# Patient Record
Sex: FEMALE | Race: WHITE | NOT HISPANIC OR LATINO | Employment: FULL TIME | URBAN - METROPOLITAN AREA
[De-identification: names, ages, dates, MRNs, and addresses within clinical notes are randomized per-mention and may not be internally consistent; named-entity substitution may affect disease eponyms.]

---

## 2023-03-06 LAB
ANION GAP IN SER/PLAS: 12 MMOL/L (ref 10–20)
APPEARANCE, URINE: NORMAL
BASOPHILS (10*3/UL) IN BLOOD BY AUTOMATED COUNT: 0.04 X10E9/L (ref 0–0.1)
BASOPHILS/100 LEUKOCYTES IN BLOOD BY AUTOMATED COUNT: 0.5 % (ref 0–2)
BILIRUBIN, URINE: NEGATIVE
BLOOD, URINE: NEGATIVE
CALCIUM (MG/DL) IN SER/PLAS: 9 MG/DL (ref 8.6–10.3)
CARBON DIOXIDE, TOTAL (MMOL/L) IN SER/PLAS: 25 MMOL/L (ref 21–32)
CHLORIDE (MMOL/L) IN SER/PLAS: 104 MMOL/L (ref 98–107)
COLOR, URINE: YELLOW
CREATININE (MG/DL) IN SER/PLAS: 0.65 MG/DL (ref 0.5–1.05)
EOSINOPHILS (10*3/UL) IN BLOOD BY AUTOMATED COUNT: 0.24 X10E9/L (ref 0–0.7)
EOSINOPHILS/100 LEUKOCYTES IN BLOOD BY AUTOMATED COUNT: 3 % (ref 0–6)
ERYTHROCYTE DISTRIBUTION WIDTH (RATIO) BY AUTOMATED COUNT: 12.3 % (ref 11.5–14.5)
ERYTHROCYTE MEAN CORPUSCULAR HEMOGLOBIN CONCENTRATION (G/DL) BY AUTOMATED: 34 G/DL (ref 32–36)
ERYTHROCYTE MEAN CORPUSCULAR VOLUME (FL) BY AUTOMATED COUNT: 89 FL (ref 80–100)
ERYTHROCYTES (10*6/UL) IN BLOOD BY AUTOMATED COUNT: 4.24 X10E12/L (ref 4–5.2)
GFR FEMALE: >90 ML/MIN/1.73M2
GLUCOSE (MG/DL) IN SER/PLAS: 81 MG/DL (ref 74–99)
GLUCOSE, URINE: NEGATIVE MG/DL
HEMATOCRIT (%) IN BLOOD BY AUTOMATED COUNT: 37.7 % (ref 36–46)
HEMOGLOBIN (G/DL) IN BLOOD: 12.8 G/DL (ref 12–16)
IMMATURE GRANULOCYTES/100 LEUKOCYTES IN BLOOD BY AUTOMATED COUNT: 0.2 % (ref 0–0.9)
KETONES, URINE: NEGATIVE MG/DL
LEUKOCYTE ESTERASE, URINE: NEGATIVE
LEUKOCYTES (10*3/UL) IN BLOOD BY AUTOMATED COUNT: 8 X10E9/L (ref 4.4–11.3)
LYMPHOCYTES (10*3/UL) IN BLOOD BY AUTOMATED COUNT: 2.59 X10E9/L (ref 1.2–4.8)
LYMPHOCYTES/100 LEUKOCYTES IN BLOOD BY AUTOMATED COUNT: 32.3 % (ref 13–44)
MONOCYTES (10*3/UL) IN BLOOD BY AUTOMATED COUNT: 0.58 X10E9/L (ref 0.1–1)
MONOCYTES/100 LEUKOCYTES IN BLOOD BY AUTOMATED COUNT: 7.2 % (ref 2–10)
NEUTROPHILS (10*3/UL) IN BLOOD BY AUTOMATED COUNT: 4.55 X10E9/L (ref 1.2–7.7)
NEUTROPHILS/100 LEUKOCYTES IN BLOOD BY AUTOMATED COUNT: 56.8 % (ref 40–80)
NITRITE, URINE: NEGATIVE
PH, URINE: 5 (ref 5–8)
PLATELETS (10*3/UL) IN BLOOD AUTOMATED COUNT: 291 X10E9/L (ref 150–450)
POTASSIUM (MMOL/L) IN SER/PLAS: 4 MMOL/L (ref 3.5–5.3)
PROTEIN, URINE: NEGATIVE MG/DL
SODIUM (MMOL/L) IN SER/PLAS: 137 MMOL/L (ref 136–145)
SPECIFIC GRAVITY, URINE: 1.03 (ref 1–1.03)
UREA NITROGEN (MG/DL) IN SER/PLAS: 19 MG/DL (ref 6–23)
UROBILINOGEN, URINE: <2 MG/DL (ref 0–1.9)

## 2023-03-08 LAB — STAPH/MRSA SCREEN, CULTURE: ABNORMAL

## 2023-03-14 ENCOUNTER — APPOINTMENT (OUTPATIENT)
Dept: LAB | Facility: LAB | Age: 38
End: 2023-03-14

## 2023-03-14 LAB — SARS-COV-2 RESULT: NOT DETECTED

## 2023-04-10 LAB
GRAM STAIN: ABNORMAL
TISSUE/WOUND CULTURE/SMEAR: ABNORMAL

## 2023-06-16 ENCOUNTER — HOSPITAL ENCOUNTER (OUTPATIENT)
Dept: DATA CONVERSION | Facility: HOSPITAL | Age: 38
End: 2023-06-16
Attending: OBSTETRICS & GYNECOLOGY | Admitting: OBSTETRICS & GYNECOLOGY
Payer: COMMERCIAL

## 2023-06-16 DIAGNOSIS — N90.89 OTHER SPECIFIED NONINFLAMMATORY DISORDERS OF VULVA AND PERINEUM: ICD-10-CM

## 2023-06-16 DIAGNOSIS — R39.198 OTHER DIFFICULTIES WITH MICTURITION: ICD-10-CM

## 2023-06-16 DIAGNOSIS — N99.89 OTHER POSTPROCEDURAL COMPLICATIONS AND DISORDERS OF GENITOURINARY SYSTEM: ICD-10-CM

## 2023-06-16 DIAGNOSIS — K64.4 RESIDUAL HEMORRHOIDAL SKIN TAGS: ICD-10-CM

## 2023-06-16 DIAGNOSIS — N39.8 OTHER SPECIFIED DISORDERS OF URINARY SYSTEM: ICD-10-CM

## 2023-06-16 DIAGNOSIS — F41.9 ANXIETY DISORDER, UNSPECIFIED: ICD-10-CM

## 2023-06-22 LAB
COMPLETE PATHOLOGY REPORT: NORMAL
CONVERTED CLINICAL DIAGNOSIS-HISTORY: NORMAL
CONVERTED FINAL DIAGNOSIS: NORMAL
CONVERTED FINAL REPORT PDF LINK TO COPY AND PASTE: NORMAL
CONVERTED GROSS DESCRIPTION: NORMAL

## 2023-07-07 LAB — URINE CULTURE: NO GROWTH

## 2023-09-07 VITALS — WEIGHT: 185.19 LBS | HEIGHT: 68 IN | BODY MASS INDEX: 28.07 KG/M2

## 2023-09-30 NOTE — H&P
History of Present Illness:   Pregnant/Lactating:  ·  Are You Pregnant no   ·  Are You Currently Breastfeeding no     History Present Illness:  Reason for surgery: Overactive bladder, perineal  skin tag   HPI:    37 year old female patient who presents to the clinic today for  a TVT sling release, perineorrhaphy and cystoscopy    Prolapse Symptoms:   - Pt denies any prolapse sx's     Urinary Symptoms:  - 3/17/2023 surgery: Robotic-assisted laparoscopic supracervical hysterectomy, bilateral salpingectomy, SCP, midurethral sling, perineorrhaphy, and cystoscopy with Dr. Velazquez.  - Pt reports she is having a hard time emptying her bladder especially in the morning    Medical History  Anxiety   Depression   Migraines   Vitamin D deficiency     STOP BANG  0                                                                                                                                                         CAPRINI  3  Surgical History  Hysterectomy as above  D&C  Laparoscopy 2010   Ovarian cystectomy     Pt denies any past history of anesthetic complications such as PONV, awareness, prolonged sedation, dental damage, aspiration, cardiac arrest, difficult intubation, difficult I.V. access or unexpected hospital admissions.  No malignant hyperthermia or pseudocholinesterase deficiency.   No history blood transfusions  Pt is not a Jehov[employer] Witness and will accept blood and blood products if medically indicated.                                                Type and screen not sent.        Allergies:        Allergies:  ·  No Known Allergies :     Home Medication Review:   Home Medications Reviewed: yes     Impression/Procedure:   ·  Impression and Planned Procedure: Midurethral sling release, skin tag removal and perineorrhaphy, cystoscopy       ERAS (Enhanced Recovery After Surgery):  ·  ERAS Patient: no     Review of Systems:   Review of Systems:  All Other Systems: All other systems reviewed and  are negative        Physical Exam by System:    Constitutional: Alert, no acute distress   Eyes: Sclerae clear   ENMT: MMM, no apparent lesions or injury   Head/Neck: NCAT   Respiratory/Thorax: Normal respiratory effort on  RA   Cardiovascular: Regular rate   Gastrointestinal: Defer to OR   Genitourinary: Defer to OR   Musculoskeletal: ROM grossly normal   Extremities: No edema, erythema, or cyanosis   Neurological: no focal deficits   Breast: Deferred   Lymphatic: Deferred   Psychological: Appropriate affect and behavior   Skin: No obvious rashes or lesions     Consent:   COVID-19 Consent:  ·  COVID-19 Risk Consent Surgeon has reviewed key risks related to the risk of yamileth COVID-19 and if they contract COVID-19 what the risks are.     Attestation:   Note Completion:  I am a:  Resident/Fellow   Attending Attestation I saw and evaluated the patient.  I personally obtained the key and critical portions of the history and physical exam or was physically present for key and  critical portions performed by the resident/fellow. I reviewed the resident/fellow?s documentation and discussed the patient with the resident/fellow.  I agree with the resident/fellow?s medical decision making as documented in the note.     I personally evaluated the patient on 16-Jun-2023         Electronic Signatures:  Betty Velazquez)  (Signed 16-Jun-2023 07:46)   Authored: Note Completion   Co-Signer: History of Present Illness, Allergies, Home Medication Review, Impression/Procedure, ERAS, Review of Systems, Physical Exam,  Consent, Note Completion  Abad Borden (Resident))  (Signed 15-Aj-2023 18:49)   Authored: History of Present Illness, Allergies, Home  Medication Review, Impression/Procedure, ERAS, Review of Systems, Physical Exam, Consent, Note Completion      Last Updated: 16-Jun-2023 07:46 by Betty Velazquez)

## 2023-10-02 NOTE — OP NOTE
Post Operative Note:     PreOp Diagnosis: voiding dysfunction after sling,  perineal skin tag   Post-Procedure Diagnosis: same   Procedure: 1. Sling lysis  2. excision perineal skin tag  3.   4.   5.   Surgeon: SERAFIN Velazquez MD   Resident/Fellow/Other Assistant: none   Anesthesia: GETA   I.V. Fluids: 400   Estimated Blood Loss (mL): 20   Specimen: yes. 1) perineal skin tag   Complications: none   Findings: Sling transected without issue, skin tag  excised   Patient Returned To/Condition: PACU stable   Urine Output: 600     Operative Report Dictated:  Dictation: not applicable - note contains Operative  Report   Operative Report:    Pt consented and taken to Or where GETA anesthesia obtained. 2 g ancef given IV   Pt placed in dorsal lithotomy position and prepped and draped in standard fashion.  Monahan catheter placed. [place] retractor placed with 4 stays to expose ant vaginal wall.  Mid urethral grasped with 2 allis clamps and injected with 1% lidocaine with epi and incision made  Sharp dissection performed in form of a sling dissection.  With much difficulty the clear sling mesh was isolated to the right of urethra, undermined bluntly and transected with a knife. Significant separation was noted suggesting that it was tight,  A small jagged portion of remaining mesh was excised.  Purse sling closure with 3-0 monocryl was performed of the right sling tunnel. Closure of the vaginal mucosa performed in a running locked fashion with 3-0 monocryl with 2 interrupted sutures.    The perineal skin tag was isolated and injected with 1% lido with epi and incised.  It was then made hemostatic with bovie cautery and closed with two interrupted 3-0 monocryl sutures    The patient was cleaned and awoken from GETA and taken to the PACU in stable condition.  All sponge lap and needle counts correct x 2 and Dr. Velazquez was present and scrubbed for the entire procedure.    Attestation:   Note Completion:  Attending Attestation I  performed the procedure without a resident         Electronic Signatures:  Betty Velazquez)  (Signed 16-Jun-2023 13:25)   Authored: Post Operative Note, Note Completion      Last Updated: 16-Jun-2023 13:25 by Betty Velazquez)

## 2023-10-10 ENCOUNTER — PHARMACY VISIT (OUTPATIENT)
Dept: PHARMACY | Facility: CLINIC | Age: 38
End: 2023-10-10
Payer: COMMERCIAL

## 2023-10-10 PROCEDURE — RXMED WILLOW AMBULATORY MEDICATION CHARGE

## 2023-11-02 ENCOUNTER — PHARMACY VISIT (OUTPATIENT)
Dept: PHARMACY | Facility: CLINIC | Age: 38
End: 2023-11-02
Payer: COMMERCIAL

## 2023-11-02 ENCOUNTER — TELEMEDICINE (OUTPATIENT)
Dept: PRIMARY CARE | Facility: CLINIC | Age: 38
End: 2023-11-02
Payer: COMMERCIAL

## 2023-11-02 DIAGNOSIS — H10.33 ACUTE BACTERIAL CONJUNCTIVITIS OF BOTH EYES: Primary | ICD-10-CM

## 2023-11-02 PROCEDURE — 99213 OFFICE O/P EST LOW 20 MIN: CPT | Performed by: NURSE PRACTITIONER

## 2023-11-02 PROCEDURE — RXMED WILLOW AMBULATORY MEDICATION CHARGE

## 2023-11-02 RX ORDER — TOBRAMYCIN 3 MG/ML
2 SOLUTION/ DROPS OPHTHALMIC 4 TIMES DAILY
Qty: 5 ML | Refills: 0 | Status: SHIPPED | OUTPATIENT
Start: 2023-11-02 | End: 2023-11-15

## 2023-11-02 ASSESSMENT — ENCOUNTER SYMPTOMS
EYE ITCHING: 1
EYE DISCHARGE: 1
FACIAL SWELLING: 0
EYE PAIN: 1
CHILLS: 0
PHOTOPHOBIA: 0
FEVER: 0

## 2023-11-02 NOTE — PROGRESS NOTES
Subjective   Patient ID: Ninoska Crain is a 38 y.o. female who presents for Conjunctivitis.  Both eyes itchy and red with yellow discharge today.  Household exposure likely.  No ear pain, some recent URI sx.    Conjunctivitis   Associated symptoms include eye itching, congestion, eye discharge and eye pain. Pertinent negatives include no fever, no photophobia and no ear pain.       Review of Systems   Constitutional:  Negative for chills and fever.   HENT:  Positive for congestion. Negative for ear pain and facial swelling.    Eyes:  Positive for pain, discharge and itching. Negative for photophobia and visual disturbance.       Objective   Physical Exam  Constitutional:       Appearance: Normal appearance.   Eyes:      General:         Right eye: Discharge present.         Left eye: Discharge present.     Comments: B/l conjunctivae injected.  EOMS intact, no photophobia or pain with EOMs.   Pulmonary:      Effort: Pulmonary effort is normal.   Musculoskeletal:      Cervical back: Normal range of motion.   Neurological:      Mental Status: She is alert.   Psychiatric:         Mood and Affect: Mood normal.         Assessment/Plan   Diagnoses and all orders for this visit:  Acute bacterial conjunctivitis of both eyes  -     tobramycin (Tobrex) 0.3 % ophthalmic solution; Administer 2 drops into both eyes 4 times a day for 7 days.

## 2023-11-02 NOTE — ASSESSMENT & PLAN NOTE
Hx and limited exam are c/w bacterial conjunctivitis.  Reviewed infectivity and prophylaxis, use of drops.   Discussed typical course  versus symptoms that require immediate attention - tenseness around eye, pain with movement of eyes or to light.

## 2023-11-04 ENCOUNTER — PHARMACY VISIT (OUTPATIENT)
Dept: PHARMACY | Facility: CLINIC | Age: 38
End: 2023-11-04
Payer: COMMERCIAL

## 2023-11-04 PROCEDURE — RXMED WILLOW AMBULATORY MEDICATION CHARGE

## 2023-11-24 ENCOUNTER — HOSPITAL ENCOUNTER (INPATIENT)
Facility: HOSPITAL | Age: 38
LOS: 2 days | Discharge: HOME | DRG: 330 | End: 2023-11-26
Attending: STUDENT IN AN ORGANIZED HEALTH CARE EDUCATION/TRAINING PROGRAM | Admitting: STUDENT IN AN ORGANIZED HEALTH CARE EDUCATION/TRAINING PROGRAM
Payer: COMMERCIAL

## 2023-11-24 ENCOUNTER — APPOINTMENT (OUTPATIENT)
Dept: RADIOLOGY | Facility: HOSPITAL | Age: 38
DRG: 330 | End: 2023-11-24
Payer: COMMERCIAL

## 2023-11-24 ENCOUNTER — ANESTHESIA EVENT (OUTPATIENT)
Dept: OPERATING ROOM | Facility: HOSPITAL | Age: 38
DRG: 330 | End: 2023-11-24
Payer: COMMERCIAL

## 2023-11-24 ENCOUNTER — ANESTHESIA (OUTPATIENT)
Dept: OPERATING ROOM | Facility: HOSPITAL | Age: 38
DRG: 330 | End: 2023-11-24
Payer: COMMERCIAL

## 2023-11-24 DIAGNOSIS — K45.8 INTERNAL HERNIA: ICD-10-CM

## 2023-11-24 DIAGNOSIS — K56.609 SBO (SMALL BOWEL OBSTRUCTION) (MULTI): Primary | ICD-10-CM

## 2023-11-24 PROBLEM — N32.81 OVERACTIVE BLADDER: Chronic | Status: ACTIVE | Noted: 2023-11-24

## 2023-11-24 PROBLEM — F32.9 MAJOR DEPRESSIVE DISORDER: Chronic | Status: ACTIVE | Noted: 2023-11-24

## 2023-11-24 LAB
ABO GROUP (TYPE) IN BLOOD: NORMAL
ABO GROUP (TYPE) IN BLOOD: NORMAL
ALBUMIN SERPL BCP-MCNC: 4.5 G/DL (ref 3.4–5)
ALP SERPL-CCNC: 59 U/L (ref 33–110)
ALT SERPL W P-5'-P-CCNC: 24 U/L (ref 7–45)
ANION GAP SERPL CALC-SCNC: 16 MMOL/L (ref 10–20)
ANTIBODY SCREEN: NORMAL
APPEARANCE UR: CLEAR
AST SERPL W P-5'-P-CCNC: 22 U/L (ref 9–39)
B-HCG SERPL-ACNC: <2 MIU/ML
BASOPHILS # BLD AUTO: 0.04 X10*3/UL (ref 0–0.1)
BASOPHILS NFR BLD AUTO: 0.3 %
BILIRUB SERPL-MCNC: 0.4 MG/DL (ref 0–1.2)
BILIRUB UR STRIP.AUTO-MCNC: NEGATIVE MG/DL
BUN SERPL-MCNC: 11 MG/DL (ref 6–23)
CALCIUM SERPL-MCNC: 9.7 MG/DL (ref 8.6–10.3)
CHLORIDE SERPL-SCNC: 103 MMOL/L (ref 98–107)
CO2 SERPL-SCNC: 20 MMOL/L (ref 21–32)
COLOR UR: YELLOW
CREAT SERPL-MCNC: 0.65 MG/DL (ref 0.5–1.05)
EOSINOPHIL # BLD AUTO: 0.1 X10*3/UL (ref 0–0.7)
EOSINOPHIL NFR BLD AUTO: 0.7 %
ERYTHROCYTE [DISTWIDTH] IN BLOOD BY AUTOMATED COUNT: 12.2 % (ref 11.5–14.5)
GFR SERPL CREATININE-BSD FRML MDRD: >90 ML/MIN/1.73M*2
GLUCOSE SERPL-MCNC: 108 MG/DL (ref 74–99)
GLUCOSE UR STRIP.AUTO-MCNC: NEGATIVE MG/DL
HCG UR QL IA.RAPID: NEGATIVE
HCT VFR BLD AUTO: 42.7 % (ref 36–46)
HGB BLD-MCNC: 14 G/DL (ref 12–16)
HOLD SPECIMEN: NORMAL
IMM GRANULOCYTES # BLD AUTO: 0.06 X10*3/UL (ref 0–0.7)
IMM GRANULOCYTES NFR BLD AUTO: 0.4 % (ref 0–0.9)
KETONES UR STRIP.AUTO-MCNC: ABNORMAL MG/DL
LACTATE SERPL-SCNC: 3 MMOL/L (ref 0.4–2)
LACTATE SERPL-SCNC: 5.4 MMOL/L (ref 0.4–2)
LEUKOCYTE ESTERASE UR QL STRIP.AUTO: NEGATIVE
LIPASE SERPL-CCNC: 49 U/L (ref 9–82)
LYMPHOCYTES # BLD AUTO: 2.11 X10*3/UL (ref 1.2–4.8)
LYMPHOCYTES NFR BLD AUTO: 14.6 %
MCH RBC QN AUTO: 30.8 PG (ref 26–34)
MCHC RBC AUTO-ENTMCNC: 32.8 G/DL (ref 32–36)
MCV RBC AUTO: 94 FL (ref 80–100)
MONOCYTES # BLD AUTO: 0.64 X10*3/UL (ref 0.1–1)
MONOCYTES NFR BLD AUTO: 4.4 %
NEUTROPHILS # BLD AUTO: 11.48 X10*3/UL (ref 1.2–7.7)
NEUTROPHILS NFR BLD AUTO: 79.6 %
NITRITE UR QL STRIP.AUTO: NEGATIVE
NRBC BLD-RTO: 0 /100 WBCS (ref 0–0)
PH UR STRIP.AUTO: 8 [PH]
PLATELET # BLD AUTO: 299 X10*3/UL (ref 150–450)
POTASSIUM SERPL-SCNC: 3.8 MMOL/L (ref 3.5–5.3)
PROT SERPL-MCNC: 8.1 G/DL (ref 6.4–8.2)
PROT UR STRIP.AUTO-MCNC: NEGATIVE MG/DL
RBC # BLD AUTO: 4.54 X10*6/UL (ref 4–5.2)
RBC # UR STRIP.AUTO: NEGATIVE /UL
RH FACTOR (ANTIGEN D): NORMAL
RH FACTOR (ANTIGEN D): NORMAL
SODIUM SERPL-SCNC: 135 MMOL/L (ref 136–145)
SP GR UR STRIP.AUTO: 1.04
UROBILINOGEN UR STRIP.AUTO-MCNC: <2 MG/DL
WBC # BLD AUTO: 14.4 X10*3/UL (ref 4.4–11.3)

## 2023-11-24 PROCEDURE — 2500000005 HC RX 250 GENERAL PHARMACY W/O HCPCS: Performed by: STUDENT IN AN ORGANIZED HEALTH CARE EDUCATION/TRAINING PROGRAM

## 2023-11-24 PROCEDURE — 96365 THER/PROPH/DIAG IV INF INIT: CPT | Mod: 59

## 2023-11-24 PROCEDURE — 3600000004 HC OR TIME - INITIAL BASE CHARGE - PROCEDURE LEVEL FOUR: Performed by: STUDENT IN AN ORGANIZED HEALTH CARE EDUCATION/TRAINING PROGRAM

## 2023-11-24 PROCEDURE — 80053 COMPREHEN METABOLIC PANEL: CPT | Performed by: STUDENT IN AN ORGANIZED HEALTH CARE EDUCATION/TRAINING PROGRAM

## 2023-11-24 PROCEDURE — 2500000004 HC RX 250 GENERAL PHARMACY W/ HCPCS (ALT 636 FOR OP/ED): Performed by: ANESTHESIOLOGIST ASSISTANT

## 2023-11-24 PROCEDURE — 81003 URINALYSIS AUTO W/O SCOPE: CPT | Performed by: STUDENT IN AN ORGANIZED HEALTH CARE EDUCATION/TRAINING PROGRAM

## 2023-11-24 PROCEDURE — 49659 UNLSTD LAPS PX HRNAP HRNRPHY: CPT | Performed by: STUDENT IN AN ORGANIZED HEALTH CARE EDUCATION/TRAINING PROGRAM

## 2023-11-24 PROCEDURE — A44202 PR LAP,SURG,ENTERECTOMY,RESECT AND ANAST: Performed by: ANESTHESIOLOGIST ASSISTANT

## 2023-11-24 PROCEDURE — 3600000009 HC OR TIME - EACH INCREMENTAL 1 MINUTE - PROCEDURE LEVEL FOUR: Performed by: STUDENT IN AN ORGANIZED HEALTH CARE EDUCATION/TRAINING PROGRAM

## 2023-11-24 PROCEDURE — 2720000007 HC OR 272 NO HCPCS: Performed by: STUDENT IN AN ORGANIZED HEALTH CARE EDUCATION/TRAINING PROGRAM

## 2023-11-24 PROCEDURE — 83690 ASSAY OF LIPASE: CPT | Performed by: STUDENT IN AN ORGANIZED HEALTH CARE EDUCATION/TRAINING PROGRAM

## 2023-11-24 PROCEDURE — 2500000004 HC RX 250 GENERAL PHARMACY W/ HCPCS (ALT 636 FOR OP/ED): Performed by: STUDENT IN AN ORGANIZED HEALTH CARE EDUCATION/TRAINING PROGRAM

## 2023-11-24 PROCEDURE — 2500000004 HC RX 250 GENERAL PHARMACY W/ HCPCS (ALT 636 FOR OP/ED)

## 2023-11-24 PROCEDURE — 76856 US EXAM PELVIC COMPLETE: CPT

## 2023-11-24 PROCEDURE — 96376 TX/PRO/DX INJ SAME DRUG ADON: CPT

## 2023-11-24 PROCEDURE — 76856 US EXAM PELVIC COMPLETE: CPT | Performed by: RADIOLOGY

## 2023-11-24 PROCEDURE — 74177 CT ABD & PELVIS W/CONTRAST: CPT

## 2023-11-24 PROCEDURE — 36415 COLL VENOUS BLD VENIPUNCTURE: CPT | Performed by: STUDENT IN AN ORGANIZED HEALTH CARE EDUCATION/TRAINING PROGRAM

## 2023-11-24 PROCEDURE — 96361 HYDRATE IV INFUSION ADD-ON: CPT

## 2023-11-24 PROCEDURE — 85025 COMPLETE CBC W/AUTO DIFF WBC: CPT | Performed by: STUDENT IN AN ORGANIZED HEALTH CARE EDUCATION/TRAINING PROGRAM

## 2023-11-24 PROCEDURE — 2060000001 HC INTERMEDIATE ICU ROOM DAILY

## 2023-11-24 PROCEDURE — 83605 ASSAY OF LACTIC ACID: CPT | Performed by: EMERGENCY MEDICINE

## 2023-11-24 PROCEDURE — 2500000005 HC RX 250 GENERAL PHARMACY W/O HCPCS: Performed by: ANESTHESIOLOGIST ASSISTANT

## 2023-11-24 PROCEDURE — 3700000002 HC GENERAL ANESTHESIA TIME - EACH INCREMENTAL 1 MINUTE: Performed by: STUDENT IN AN ORGANIZED HEALTH CARE EDUCATION/TRAINING PROGRAM

## 2023-11-24 PROCEDURE — 2500000004 HC RX 250 GENERAL PHARMACY W/ HCPCS (ALT 636 FOR OP/ED): Performed by: NURSE PRACTITIONER

## 2023-11-24 PROCEDURE — 74177 CT ABD & PELVIS W/CONTRAST: CPT | Performed by: RADIOLOGY

## 2023-11-24 PROCEDURE — 86901 BLOOD TYPING SEROLOGIC RH(D): CPT | Performed by: ANESTHESIOLOGIST ASSISTANT

## 2023-11-24 PROCEDURE — 81025 URINE PREGNANCY TEST: CPT | Performed by: STUDENT IN AN ORGANIZED HEALTH CARE EDUCATION/TRAINING PROGRAM

## 2023-11-24 PROCEDURE — 36415 COLL VENOUS BLD VENIPUNCTURE: CPT | Performed by: EMERGENCY MEDICINE

## 2023-11-24 PROCEDURE — 99223 1ST HOSP IP/OBS HIGH 75: CPT | Performed by: STUDENT IN AN ORGANIZED HEALTH CARE EDUCATION/TRAINING PROGRAM

## 2023-11-24 PROCEDURE — 2550000001 HC RX 255 CONTRASTS

## 2023-11-24 PROCEDURE — 85025 COMPLETE CBC W/AUTO DIFF WBC: CPT | Performed by: EMERGENCY MEDICINE

## 2023-11-24 PROCEDURE — 96372 THER/PROPH/DIAG INJ SC/IM: CPT | Performed by: STUDENT IN AN ORGANIZED HEALTH CARE EDUCATION/TRAINING PROGRAM

## 2023-11-24 PROCEDURE — 96360 HYDRATION IV INFUSION INIT: CPT | Mod: 59

## 2023-11-24 PROCEDURE — 7100000001 HC RECOVERY ROOM TIME - INITIAL BASE CHARGE: Performed by: STUDENT IN AN ORGANIZED HEALTH CARE EDUCATION/TRAINING PROGRAM

## 2023-11-24 PROCEDURE — 83605 ASSAY OF LACTIC ACID: CPT | Performed by: STUDENT IN AN ORGANIZED HEALTH CARE EDUCATION/TRAINING PROGRAM

## 2023-11-24 PROCEDURE — 83690 ASSAY OF LIPASE: CPT | Performed by: EMERGENCY MEDICINE

## 2023-11-24 PROCEDURE — 7100000002 HC RECOVERY ROOM TIME - EACH INCREMENTAL 1 MINUTE: Performed by: STUDENT IN AN ORGANIZED HEALTH CARE EDUCATION/TRAINING PROGRAM

## 2023-11-24 PROCEDURE — 76830 TRANSVAGINAL US NON-OB: CPT | Performed by: RADIOLOGY

## 2023-11-24 PROCEDURE — 3700000001 HC GENERAL ANESTHESIA TIME - INITIAL BASE CHARGE: Performed by: STUDENT IN AN ORGANIZED HEALTH CARE EDUCATION/TRAINING PROGRAM

## 2023-11-24 PROCEDURE — 80053 COMPREHEN METABOLIC PANEL: CPT | Performed by: EMERGENCY MEDICINE

## 2023-11-24 PROCEDURE — 99285 EMERGENCY DEPT VISIT HI MDM: CPT | Performed by: STUDENT IN AN ORGANIZED HEALTH CARE EDUCATION/TRAINING PROGRAM

## 2023-11-24 PROCEDURE — A44202 PR LAP,SURG,ENTERECTOMY,RESECT AND ANAST: Performed by: STUDENT IN AN ORGANIZED HEALTH CARE EDUCATION/TRAINING PROGRAM

## 2023-11-24 PROCEDURE — 96375 TX/PRO/DX INJ NEW DRUG ADDON: CPT

## 2023-11-24 PROCEDURE — 84702 CHORIONIC GONADOTROPIN TEST: CPT

## 2023-11-24 PROCEDURE — 99140 ANES COMP EMERGENCY COND: CPT | Performed by: STUDENT IN AN ORGANIZED HEALTH CARE EDUCATION/TRAINING PROGRAM

## 2023-11-24 PROCEDURE — 99285 EMERGENCY DEPT VISIT HI MDM: CPT | Mod: 25 | Performed by: STUDENT IN AN ORGANIZED HEALTH CARE EDUCATION/TRAINING PROGRAM

## 2023-11-24 RX ORDER — HYDROMORPHONE HYDROCHLORIDE 1 MG/ML
INJECTION, SOLUTION INTRAMUSCULAR; INTRAVENOUS; SUBCUTANEOUS AS NEEDED
Status: DISCONTINUED | OUTPATIENT
Start: 2023-11-24 | End: 2023-11-24

## 2023-11-24 RX ORDER — MORPHINE SULFATE 4 MG/ML
4 INJECTION, SOLUTION INTRAMUSCULAR; INTRAVENOUS ONCE
Status: COMPLETED | OUTPATIENT
Start: 2023-11-24 | End: 2023-11-24

## 2023-11-24 RX ORDER — ONDANSETRON HYDROCHLORIDE 2 MG/ML
4 INJECTION, SOLUTION INTRAVENOUS EVERY 8 HOURS PRN
Status: DISCONTINUED | OUTPATIENT
Start: 2023-11-24 | End: 2023-11-26 | Stop reason: HOSPADM

## 2023-11-24 RX ORDER — SERTRALINE HYDROCHLORIDE 100 MG/1
200 TABLET, FILM COATED ORAL DAILY
Status: DISCONTINUED | OUTPATIENT
Start: 2023-11-24 | End: 2023-11-24

## 2023-11-24 RX ORDER — MEPERIDINE HYDROCHLORIDE 50 MG/ML
12.5 INJECTION INTRAMUSCULAR; INTRAVENOUS; SUBCUTANEOUS EVERY 10 MIN PRN
Status: DISCONTINUED | OUTPATIENT
Start: 2023-11-24 | End: 2023-11-24

## 2023-11-24 RX ORDER — ROCURONIUM BROMIDE 10 MG/ML
INJECTION, SOLUTION INTRAVENOUS AS NEEDED
Status: DISCONTINUED | OUTPATIENT
Start: 2023-11-24 | End: 2023-11-24

## 2023-11-24 RX ORDER — DIPHENHYDRAMINE HYDROCHLORIDE 50 MG/ML
12.5 INJECTION INTRAMUSCULAR; INTRAVENOUS ONCE AS NEEDED
Status: COMPLETED | OUTPATIENT
Start: 2023-11-24 | End: 2023-11-24

## 2023-11-24 RX ORDER — DEXAMETHASONE SODIUM PHOSPHATE 100 MG/10ML
INJECTION INTRAMUSCULAR; INTRAVENOUS AS NEEDED
Status: DISCONTINUED | OUTPATIENT
Start: 2023-11-24 | End: 2023-11-24

## 2023-11-24 RX ORDER — SODIUM CHLORIDE, SODIUM LACTATE, POTASSIUM CHLORIDE, CALCIUM CHLORIDE 600; 310; 30; 20 MG/100ML; MG/100ML; MG/100ML; MG/100ML
50 INJECTION, SOLUTION INTRAVENOUS CONTINUOUS
Status: DISCONTINUED | OUTPATIENT
Start: 2023-11-24 | End: 2023-11-25

## 2023-11-24 RX ORDER — ONDANSETRON 4 MG/1
4 TABLET, ORALLY DISINTEGRATING ORAL EVERY 8 HOURS PRN
Status: DISCONTINUED | OUTPATIENT
Start: 2023-11-24 | End: 2023-11-26 | Stop reason: HOSPADM

## 2023-11-24 RX ORDER — OXYCODONE HYDROCHLORIDE 5 MG/1
5 TABLET ORAL EVERY 6 HOURS PRN
Status: DISCONTINUED | OUTPATIENT
Start: 2023-11-24 | End: 2023-11-26 | Stop reason: HOSPADM

## 2023-11-24 RX ORDER — SERTRALINE HYDROCHLORIDE 100 MG/1
100 TABLET, FILM COATED ORAL 2 TIMES DAILY
Status: DISCONTINUED | OUTPATIENT
Start: 2023-11-24 | End: 2023-11-26 | Stop reason: HOSPADM

## 2023-11-24 RX ORDER — METOCLOPRAMIDE HYDROCHLORIDE 5 MG/ML
10 INJECTION INTRAMUSCULAR; INTRAVENOUS ONCE AS NEEDED
Status: DISCONTINUED | OUTPATIENT
Start: 2023-11-24 | End: 2023-11-24

## 2023-11-24 RX ORDER — LIDOCAINE HYDROCHLORIDE 20 MG/ML
INJECTION, SOLUTION INFILTRATION; PERINEURAL AS NEEDED
Status: DISCONTINUED | OUTPATIENT
Start: 2023-11-24 | End: 2023-11-24

## 2023-11-24 RX ORDER — SODIUM CHLORIDE, SODIUM LACTATE, POTASSIUM CHLORIDE, CALCIUM CHLORIDE 600; 310; 30; 20 MG/100ML; MG/100ML; MG/100ML; MG/100ML
100 INJECTION, SOLUTION INTRAVENOUS CONTINUOUS
Status: DISCONTINUED | OUTPATIENT
Start: 2023-11-24 | End: 2023-11-24

## 2023-11-24 RX ORDER — OXYCODONE HYDROCHLORIDE 5 MG/1
5 TABLET ORAL EVERY 4 HOURS PRN
Status: DISCONTINUED | OUTPATIENT
Start: 2023-11-24 | End: 2023-11-24

## 2023-11-24 RX ORDER — SERTRALINE HYDROCHLORIDE 100 MG/1
100 TABLET, FILM COATED ORAL 2 TIMES DAILY
Status: DISCONTINUED | OUTPATIENT
Start: 2023-11-25 | End: 2023-11-24

## 2023-11-24 RX ORDER — MIDAZOLAM HYDROCHLORIDE 1 MG/ML
INJECTION, SOLUTION INTRAMUSCULAR; INTRAVENOUS AS NEEDED
Status: DISCONTINUED | OUTPATIENT
Start: 2023-11-24 | End: 2023-11-24

## 2023-11-24 RX ORDER — ALBUTEROL SULFATE 0.83 MG/ML
2.5 SOLUTION RESPIRATORY (INHALATION) ONCE AS NEEDED
Status: DISCONTINUED | OUTPATIENT
Start: 2023-11-24 | End: 2023-11-26 | Stop reason: HOSPADM

## 2023-11-24 RX ORDER — ACETAMINOPHEN 325 MG/1
650 TABLET ORAL EVERY 4 HOURS PRN
Status: DISCONTINUED | OUTPATIENT
Start: 2023-11-24 | End: 2023-11-26 | Stop reason: HOSPADM

## 2023-11-24 RX ORDER — LIDOCAINE HYDROCHLORIDE 10 MG/ML
0.1 INJECTION INFILTRATION; PERINEURAL ONCE
Status: DISCONTINUED | OUTPATIENT
Start: 2023-11-24 | End: 2023-11-24

## 2023-11-24 RX ORDER — BUPIVACAINE HCL/EPINEPHRINE 0.5-1:200K
VIAL (ML) INJECTION AS NEEDED
Status: DISCONTINUED | OUTPATIENT
Start: 2023-11-24 | End: 2023-11-24 | Stop reason: HOSPADM

## 2023-11-24 RX ORDER — FENTANYL CITRATE 50 UG/ML
25 INJECTION, SOLUTION INTRAMUSCULAR; INTRAVENOUS EVERY 5 MIN PRN
Status: DISCONTINUED | OUTPATIENT
Start: 2023-11-24 | End: 2023-11-24

## 2023-11-24 RX ORDER — FENTANYL CITRATE 50 UG/ML
INJECTION, SOLUTION INTRAMUSCULAR; INTRAVENOUS AS NEEDED
Status: DISCONTINUED | OUTPATIENT
Start: 2023-11-24 | End: 2023-11-24

## 2023-11-24 RX ORDER — ONDANSETRON HYDROCHLORIDE 2 MG/ML
INJECTION, SOLUTION INTRAVENOUS AS NEEDED
Status: DISCONTINUED | OUTPATIENT
Start: 2023-11-24 | End: 2023-11-24

## 2023-11-24 RX ORDER — HEPARIN SODIUM 5000 [USP'U]/ML
5000 INJECTION, SOLUTION INTRAVENOUS; SUBCUTANEOUS EVERY 8 HOURS SCHEDULED
Status: DISCONTINUED | OUTPATIENT
Start: 2023-11-24 | End: 2023-11-26 | Stop reason: HOSPADM

## 2023-11-24 RX ORDER — ONDANSETRON HYDROCHLORIDE 2 MG/ML
4 INJECTION, SOLUTION INTRAVENOUS ONCE
Status: COMPLETED | OUTPATIENT
Start: 2023-11-24 | End: 2023-11-24

## 2023-11-24 RX ORDER — NALOXONE HYDROCHLORIDE 0.4 MG/ML
0.2 INJECTION, SOLUTION INTRAMUSCULAR; INTRAVENOUS; SUBCUTANEOUS EVERY 5 MIN PRN
Status: DISCONTINUED | OUTPATIENT
Start: 2023-11-24 | End: 2023-11-26 | Stop reason: HOSPADM

## 2023-11-24 RX ORDER — KETOROLAC TROMETHAMINE 15 MG/ML
15 INJECTION, SOLUTION INTRAMUSCULAR; INTRAVENOUS EVERY 8 HOURS SCHEDULED
Status: COMPLETED | OUTPATIENT
Start: 2023-11-24 | End: 2023-11-26

## 2023-11-24 RX ORDER — OXYCODONE HYDROCHLORIDE 10 MG/1
10 TABLET ORAL EVERY 6 HOURS PRN
Status: DISCONTINUED | OUTPATIENT
Start: 2023-11-24 | End: 2023-11-26 | Stop reason: HOSPADM

## 2023-11-24 RX ORDER — PROPOFOL 10 MG/ML
INJECTION, EMULSION INTRAVENOUS AS NEEDED
Status: DISCONTINUED | OUTPATIENT
Start: 2023-11-24 | End: 2023-11-24

## 2023-11-24 RX ORDER — LABETALOL HYDROCHLORIDE 5 MG/ML
5 INJECTION, SOLUTION INTRAVENOUS ONCE AS NEEDED
Status: DISCONTINUED | OUTPATIENT
Start: 2023-11-24 | End: 2023-11-24

## 2023-11-24 RX ADMIN — ONDANSETRON 4 MG: 2 INJECTION INTRAMUSCULAR; INTRAVENOUS at 20:03

## 2023-11-24 RX ADMIN — ROCURONIUM BROMIDE 50 MG: 10 INJECTION, SOLUTION INTRAVENOUS at 18:59

## 2023-11-24 RX ADMIN — ROCURONIUM BROMIDE 10 MG: 10 INJECTION, SOLUTION INTRAVENOUS at 19:55

## 2023-11-24 RX ADMIN — PROPOFOL 200 MG: 10 INJECTION, EMULSION INTRAVENOUS at 18:59

## 2023-11-24 RX ADMIN — MORPHINE SULFATE 4 MG: 4 INJECTION, SOLUTION INTRAMUSCULAR; INTRAVENOUS at 16:28

## 2023-11-24 RX ADMIN — HYDROMORPHONE HYDROCHLORIDE 0.5 MG: 1 INJECTION, SOLUTION INTRAMUSCULAR; INTRAVENOUS; SUBCUTANEOUS at 20:09

## 2023-11-24 RX ADMIN — KETOROLAC TROMETHAMINE 15 MG: 15 INJECTION, SOLUTION INTRAMUSCULAR; INTRAVENOUS at 22:24

## 2023-11-24 RX ADMIN — ONDANSETRON 4 MG: 2 INJECTION INTRAMUSCULAR; INTRAVENOUS at 14:38

## 2023-11-24 RX ADMIN — FENTANYL CITRATE 100 MCG: 50 INJECTION, SOLUTION INTRAMUSCULAR; INTRAVENOUS at 18:59

## 2023-11-24 RX ADMIN — SODIUM CHLORIDE, SODIUM LACTATE, POTASSIUM CHLORIDE, AND CALCIUM CHLORIDE: 600; 310; 30; 20 INJECTION, SOLUTION INTRAVENOUS at 18:53

## 2023-11-24 RX ADMIN — SODIUM CHLORIDE, POTASSIUM CHLORIDE, SODIUM LACTATE AND CALCIUM CHLORIDE 1000 ML: 600; 310; 30; 20 INJECTION, SOLUTION INTRAVENOUS at 14:22

## 2023-11-24 RX ADMIN — HYDROMORPHONE HYDROCHLORIDE 0.5 MG: 1 INJECTION, SOLUTION INTRAMUSCULAR; INTRAVENOUS; SUBCUTANEOUS at 20:46

## 2023-11-24 RX ADMIN — HYDROMORPHONE HYDROCHLORIDE 0.5 MG: 1 INJECTION, SOLUTION INTRAMUSCULAR; INTRAVENOUS; SUBCUTANEOUS at 19:20

## 2023-11-24 RX ADMIN — SERTRALINE HYDROCHLORIDE 100 MG: 100 TABLET ORAL at 23:02

## 2023-11-24 RX ADMIN — HYDROMORPHONE HYDROCHLORIDE 0.5 MG: 1 INJECTION, SOLUTION INTRAMUSCULAR; INTRAVENOUS; SUBCUTANEOUS at 20:59

## 2023-11-24 RX ADMIN — DEXAMETHASONE SODIUM PHOSPHATE 10 MG: 10 INJECTION INTRAMUSCULAR; INTRAVENOUS at 19:13

## 2023-11-24 RX ADMIN — SODIUM CHLORIDE, POTASSIUM CHLORIDE, SODIUM LACTATE AND CALCIUM CHLORIDE 50 ML/HR: 600; 310; 30; 20 INJECTION, SOLUTION INTRAVENOUS at 22:25

## 2023-11-24 RX ADMIN — SUGAMMADEX 200 MG: 100 INJECTION, SOLUTION INTRAVENOUS at 20:03

## 2023-11-24 RX ADMIN — DIPHENHYDRAMINE HYDROCHLORIDE 12.5 MG: 50 INJECTION, SOLUTION INTRAMUSCULAR; INTRAVENOUS at 21:19

## 2023-11-24 RX ADMIN — IOHEXOL 75 ML: 350 INJECTION, SOLUTION INTRAVENOUS at 15:47

## 2023-11-24 RX ADMIN — MORPHINE SULFATE 4 MG: 4 INJECTION, SOLUTION INTRAMUSCULAR; INTRAVENOUS at 14:38

## 2023-11-24 RX ADMIN — LIDOCAINE HYDROCHLORIDE 100 MG: 20 INJECTION, SOLUTION INFILTRATION; PERINEURAL at 18:59

## 2023-11-24 RX ADMIN — HEPARIN SODIUM 5000 UNITS: 5000 INJECTION INTRAVENOUS; SUBCUTANEOUS at 22:24

## 2023-11-24 RX ADMIN — PIPERACILLIN SODIUM AND TAZOBACTAM SODIUM 4.5 G: 4; .5 INJECTION, SOLUTION INTRAVENOUS at 17:22

## 2023-11-24 RX ADMIN — MIDAZOLAM 2 MG: 1 INJECTION INTRAMUSCULAR; INTRAVENOUS at 18:53

## 2023-11-24 RX ADMIN — SODIUM CHLORIDE, SODIUM LACTATE, POTASSIUM CHLORIDE, AND CALCIUM CHLORIDE 1000 ML: 600; 310; 30; 20 INJECTION, SOLUTION INTRAVENOUS at 18:12

## 2023-11-24 SDOH — HEALTH STABILITY: MENTAL HEALTH: CURRENT SMOKER: 0

## 2023-11-24 SDOH — SOCIAL STABILITY: SOCIAL INSECURITY: DO YOU FEEL ANYONE HAS EXPLOITED OR TAKEN ADVANTAGE OF YOU FINANCIALLY OR OF YOUR PERSONAL PROPERTY?: NO

## 2023-11-24 SDOH — SOCIAL STABILITY: SOCIAL INSECURITY: ARE THERE ANY APPARENT SIGNS OF INJURIES/BEHAVIORS THAT COULD BE RELATED TO ABUSE/NEGLECT?: NO

## 2023-11-24 SDOH — SOCIAL STABILITY: SOCIAL INSECURITY: DO YOU FEEL UNSAFE GOING BACK TO THE PLACE WHERE YOU ARE LIVING?: NO

## 2023-11-24 SDOH — SOCIAL STABILITY: SOCIAL INSECURITY: WERE YOU ABLE TO COMPLETE ALL THE BEHAVIORAL HEALTH SCREENINGS?: YES

## 2023-11-24 SDOH — SOCIAL STABILITY: SOCIAL INSECURITY: HAS ANYONE EVER THREATENED TO HURT YOUR FAMILY OR YOUR PETS?: NO

## 2023-11-24 SDOH — SOCIAL STABILITY: SOCIAL INSECURITY: DOES ANYONE TRY TO KEEP YOU FROM HAVING/CONTACTING OTHER FRIENDS OR DOING THINGS OUTSIDE YOUR HOME?: NO

## 2023-11-24 SDOH — SOCIAL STABILITY: SOCIAL INSECURITY: ABUSE: ADULT

## 2023-11-24 SDOH — SOCIAL STABILITY: SOCIAL INSECURITY: HAVE YOU HAD THOUGHTS OF HARMING ANYONE ELSE?: NO

## 2023-11-24 SDOH — SOCIAL STABILITY: SOCIAL INSECURITY: ARE YOU OR HAVE YOU BEEN THREATENED OR ABUSED PHYSICALLY, EMOTIONALLY, OR SEXUALLY BY ANYONE?: NO

## 2023-11-24 ASSESSMENT — LIFESTYLE VARIABLES
EVER FELT BAD OR GUILTY ABOUT YOUR DRINKING: NO
REASON UNABLE TO ASSESS: NO
HOW OFTEN DO YOU HAVE 6 OR MORE DRINKS ON ONE OCCASION: NEVER
AUDIT-C TOTAL SCORE: 0
HOW MANY STANDARD DRINKS CONTAINING ALCOHOL DO YOU HAVE ON A TYPICAL DAY: PATIENT DOES NOT DRINK
HAVE PEOPLE ANNOYED YOU BY CRITICIZING YOUR DRINKING: NO
EVER HAD A DRINK FIRST THING IN THE MORNING TO STEADY YOUR NERVES TO GET RID OF A HANGOVER: NO
SKIP TO QUESTIONS 9-10: 1
HAVE YOU EVER FELT YOU SHOULD CUT DOWN ON YOUR DRINKING: NO
HOW OFTEN DO YOU HAVE A DRINK CONTAINING ALCOHOL: NEVER
AUDIT-C TOTAL SCORE: 0

## 2023-11-24 ASSESSMENT — PAIN SCALES - GENERAL
PAINLEVEL_OUTOF10: 7
PAINLEVEL_OUTOF10: 9
PAINLEVEL_OUTOF10: 7
PAINLEVEL_OUTOF10: 9
PAIN_LEVEL: 0
PAINLEVEL_OUTOF10: 10 - WORST POSSIBLE PAIN
PAINLEVEL_OUTOF10: 3
PAINLEVEL_OUTOF10: 8
PAINLEVEL_OUTOF10: 3
PAINLEVEL_OUTOF10: 0 - NO PAIN
PAINLEVEL_OUTOF10: 5 - MODERATE PAIN
PAINLEVEL_OUTOF10: 0 - NO PAIN
PAINLEVEL_OUTOF10: 0 - NO PAIN

## 2023-11-24 ASSESSMENT — ACTIVITIES OF DAILY LIVING (ADL)
BATHING: INDEPENDENT
HEARING - LEFT EAR: UNABLE TO ASSESS
PATIENT'S MEMORY ADEQUATE TO SAFELY COMPLETE DAILY ACTIVITIES?: YES
GROOMING: INDEPENDENT
HEARING - LEFT EAR: FUNCTIONAL
FEEDING YOURSELF: INDEPENDENT
LACK_OF_TRANSPORTATION: NO
BATHING: INDEPENDENT
JUDGMENT_ADEQUATE_SAFELY_COMPLETE_DAILY_ACTIVITIES: YES
HEARING - RIGHT EAR: UNABLE TO ASSESS
JUDGMENT_ADEQUATE_SAFELY_COMPLETE_DAILY_ACTIVITIES: YES
FEEDING YOURSELF: INDEPENDENT
HEARING - RIGHT EAR: FUNCTIONAL
WALKS IN HOME: INDEPENDENT
PATIENT'S MEMORY ADEQUATE TO SAFELY COMPLETE DAILY ACTIVITIES?: YES
GROOMING: INDEPENDENT
DRESSING YOURSELF: INDEPENDENT
TOILETING: INDEPENDENT
TOILETING: INDEPENDENT
ADEQUATE_TO_COMPLETE_ADL: YES
ADEQUATE_TO_COMPLETE_ADL: YES
DRESSING YOURSELF: INDEPENDENT

## 2023-11-24 ASSESSMENT — PAIN - FUNCTIONAL ASSESSMENT
PAIN_FUNCTIONAL_ASSESSMENT: 0-10
PAIN_FUNCTIONAL_ASSESSMENT: CPOT (CRITICAL CARE PAIN OBSERVATION TOOL)
PAIN_FUNCTIONAL_ASSESSMENT: 0-10

## 2023-11-24 ASSESSMENT — PATIENT HEALTH QUESTIONNAIRE - PHQ9
1. LITTLE INTEREST OR PLEASURE IN DOING THINGS: NOT AT ALL
SUM OF ALL RESPONSES TO PHQ9 QUESTIONS 1 & 2: 0
2. FEELING DOWN, DEPRESSED OR HOPELESS: NOT AT ALL

## 2023-11-24 ASSESSMENT — ENCOUNTER SYMPTOMS
ANAL BLEEDING: 0
ABDOMINAL DISTENTION: 0
ABDOMINAL PAIN: 1
AGITATION: 0
LIGHT-HEADEDNESS: 0
UNEXPECTED WEIGHT CHANGE: 0
DIARRHEA: 0
FATIGUE: 0
RECTAL PAIN: 1
CHEST TIGHTNESS: 0
APPETITE CHANGE: 0
FREQUENCY: 0
WEAKNESS: 0
NAUSEA: 0
DYSURIA: 0
WHEEZING: 0
PALPITATIONS: 0
CONSTIPATION: 0
CHOKING: 0
VOMITING: 0
BLOOD IN STOOL: 0
CHILLS: 0
COUGH: 0
FEVER: 0
DIFFICULTY URINATING: 0
SHORTNESS OF BREATH: 0
ACTIVITY CHANGE: 0
DIZZINESS: 0
HEMATURIA: 0

## 2023-11-24 ASSESSMENT — PAIN DESCRIPTION - ORIENTATION
ORIENTATION: MID

## 2023-11-24 ASSESSMENT — COGNITIVE AND FUNCTIONAL STATUS - GENERAL
PATIENT BASELINE BEDBOUND: NO
DAILY ACTIVITIY SCORE: 24
MOBILITY SCORE: 24

## 2023-11-24 ASSESSMENT — PAIN DESCRIPTION - LOCATION
LOCATION: ABDOMEN

## 2023-11-24 ASSESSMENT — COLUMBIA-SUICIDE SEVERITY RATING SCALE - C-SSRS
2. HAVE YOU ACTUALLY HAD ANY THOUGHTS OF KILLING YOURSELF?: NO
1. IN THE PAST MONTH, HAVE YOU WISHED YOU WERE DEAD OR WISHED YOU COULD GO TO SLEEP AND NOT WAKE UP?: NO
6. HAVE YOU EVER DONE ANYTHING, STARTED TO DO ANYTHING, OR PREPARED TO DO ANYTHING TO END YOUR LIFE?: NO

## 2023-11-24 NOTE — H&P
History Of Present Illness  April Paras is a 38 y.o. female presenting for evaluation of acute onset abdominal pain.  Patient reports that abdominal pain started this a.m. following defecation.  Patient reports that pain was deep in the pelvis with radiation to the rectum.  Pain described as constant, sharp, 10 out of 10 at its worst.  Patient unable to identify any precipitating factors.  Laying in supine position and activity aggravates the pain.  No N5 alleviating factors.  Pain associated with nausea.  Patient has never had pain of this nature before.  Due to the unrelenting nature of her severe pain, patient presented to [facility] to see department for further evaluation.    At time of presentation, patient afebrile with temperature of 36.1.  Heart rate of 94.  Blood pressure 132/60.  Initial pulse oximetry 98% on room air.  Serum studies notable for leukocytosis to 14.4 thousand.  There is no anemia and platelet count within normal limits at 209 9.  Comprehensive metabolic panel notable for glucose to 108, mild hyponatremia to 135, decrease bicarbonate 20.  Liver function testing as well as serum lipase within normal limits.  Qualitative hCG negative.  Quantitative hCG less than 2.  Initial serum lactate at approximately 1300 hrs. was 3.  Patient received intravenous fluid resuscitation but despite this, most recent repeat serum lactate at approximately 1700 hrs. is 5.4.  CT of the abdomen and pelvis was performed demonstrating results listed below; mid to distal small bowel obstruction including possible closed-loop obstruction, small volume of ascites.  Pelvic ultrasound was performed.  The right ovary was not visualized.  Left ovary noted to be 3.2 x 2.8 x 2.4 cm.  Additional ultrasound findings notable for mildly dilated predominantly fluid-filled bowel segments present within the pelvis several also with mild mural thickening probably due to mucosal edema suggestive of ileus or early or  partial small bowel obstruction.  Surgery was consulted.    At time of evaluation, patient is resting in bed.  Mother is at bedside.  Patient reports that she has just only achieved mild abdominal pain relief with recent administration of morphine.  Currently rating her pain 7 out of 10, still constant.  Last p.o. intake was this a.m. at approximately 8:30 in the morning..     Past Medical History  Past Medical History:   Diagnosis Date    Major depressive disorder, recurrent, in full remission (CMS/[facility]) 10/20/2021    Major depression, recurrent, full remission    Personal history of other mental and behavioral disorders 11/16/2018    History of depression    Personal history of other mental and behavioral disorders 12/14/2019    History of anxiety    Pregnancy with inconclusive fetal viability, not applicable or unspecified 03/12/2019    Encounter to determine fetal viability of pregnancy       Surgical History  Past Surgical History:   Procedure Laterality Date    DILATION AND CURETTAGE OF UTERUS  02/13/2018    Dilation And Curettage    LAPAROSCOPY DIAGNOSTIC / BIOPSY / ASPIRATION / LYSIS  07/31/2014    Laparoscopy (Diagnostic)    OTHER SURGICAL HISTORY  02/13/2018    Dental Surgery    OTHER SURGICAL HISTORY  09/13/2022    Ovarian cystectomy   Hysterectomy  Bladder sling performed from perineal approach  Diagnostic laparoscopy for left ovarian cyst     Social History  Denies smoking, EtOH use, illicit substance use    Family History  Non-contributory     Allergies  Patient has no known allergies.    Review of Systems   Constitutional:  Negative for activity change, appetite change, chills, fatigue, fever and unexpected weight change.   Respiratory:  Negative for cough, choking, chest tightness, shortness of breath and wheezing.    Cardiovascular:  Negative for chest pain, palpitations and leg swelling.   Gastrointestinal:  Positive for abdominal pain and rectal pain. Negative for abdominal distention, anal  "bleeding, blood in stool, constipation, diarrhea, nausea and vomiting.   Genitourinary:  Negative for difficulty urinating, dysuria, frequency and hematuria.   Neurological:  Negative for dizziness, weakness and light-headedness.   Psychiatric/Behavioral:  Negative for agitation.         Physical Exam  Constitutional:       General: She is not in acute distress.     Appearance: Normal appearance. She is not ill-appearing.   HENT:      Head: Normocephalic.      Mouth/Throat:      Mouth: Mucous membranes are moist.   Eyes:      Extraocular Movements: Extraocular movements intact.      Pupils: Pupils are equal, round, and reactive to light.   Cardiovascular:      Rate and Rhythm: Normal rate and regular rhythm.      Pulses: Normal pulses.      Heart sounds: Normal heart sounds. No murmur heard.  Pulmonary:      Effort: No respiratory distress.      Breath sounds: No wheezing, rhonchi or rales.   Chest:      Chest wall: No tenderness.   Abdominal:      General: There is no distension.      Palpations: There is no mass.      Tenderness: There is abdominal tenderness. There is guarding and rebound.      Hernia: No hernia is present.   Genitourinary:     Rectum: Normal.   Musculoskeletal:         General: No swelling or deformity.      Cervical back: No rigidity.      Right lower leg: No edema.      Left lower leg: No edema.   Skin:     General: Skin is warm.      Coloration: Skin is not jaundiced or pale.   Neurological:      Mental Status: She is alert.          Last Recorded Vitals  Blood pressure 136/64, pulse 71, temperature 36.1 °C (97 °F), temperature source Temporal, resp. rate 20, height 1.727 m (5' 8\"), weight 83.9 kg (185 lb), SpO2 100 %.    Relevant Results        Results for orders placed or performed during the hospital encounter of 11/24/23 (from the past 24 hour(s))   Urine Gray Tube   Result Value Ref Range    Extra Tube Hold for add-ons.    Urinalysis with Reflex Microscopic   Result Value Ref Range    " Color, Urine Yellow Straw, Yellow    Appearance, Urine Clear Clear    Specific Gravity, Urine 1.038 (N) 1.005 - 1.035    pH, Urine 8.0 5.0, 5.5, 6.0, 6.5, 7.0, 7.5, 8.0    Protein, Urine NEGATIVE NEGATIVE mg/dL    Glucose, Urine NEGATIVE NEGATIVE mg/dL    Blood, Urine NEGATIVE NEGATIVE    Ketones, Urine 5 (TRACE) (A) NEGATIVE mg/dL    Bilirubin, Urine NEGATIVE NEGATIVE    Urobilinogen, Urine <2.0 <2.0 mg/dL    Nitrite, Urine NEGATIVE NEGATIVE    Leukocyte Esterase, Urine NEGATIVE NEGATIVE   hCG, Urine, Qualitative   Result Value Ref Range    HCG, Urine NEGATIVE NEGATIVE   Comprehensive metabolic panel   Result Value Ref Range    Glucose 108 (H) 74 - 99 mg/dL    Sodium 135 (L) 136 - 145 mmol/L    Potassium 3.8 3.5 - 5.3 mmol/L    Chloride 103 98 - 107 mmol/L    Bicarbonate 20 (L) 21 - 32 mmol/L    Anion Gap 16 10 - 20 mmol/L    Urea Nitrogen 11 6 - 23 mg/dL    Creatinine 0.65 0.50 - 1.05 mg/dL    eGFR >90 >60 mL/min/1.73m*2    Calcium 9.7 8.6 - 10.3 mg/dL    Albumin 4.5 3.4 - 5.0 g/dL    Alkaline Phosphatase 59 33 - 110 U/L    Total Protein 8.1 6.4 - 8.2 g/dL    AST 22 9 - 39 U/L    Bilirubin, Total 0.4 0.0 - 1.2 mg/dL    ALT 24 7 - 45 U/L   CBC and Auto Differential   Result Value Ref Range    WBC 14.4 (H) 4.4 - 11.3 x10*3/uL    nRBC 0.0 0.0 - 0.0 /100 WBCs    RBC 4.54 4.00 - 5.20 x10*6/uL    Hemoglobin 14.0 12.0 - 16.0 g/dL    Hematocrit 42.7 36.0 - 46.0 %    MCV 94 80 - 100 fL    MCH 30.8 26.0 - 34.0 pg    MCHC 32.8 32.0 - 36.0 g/dL    RDW 12.2 11.5 - 14.5 %    Platelets 299 150 - 450 x10*3/uL    Neutrophils % 79.6 40.0 - 80.0 %    Immature Granulocytes %, Automated 0.4 0.0 - 0.9 %    Lymphocytes % 14.6 13.0 - 44.0 %    Monocytes % 4.4 2.0 - 10.0 %    Eosinophils % 0.7 0.0 - 6.0 %    Basophils % 0.3 0.0 - 2.0 %    Neutrophils Absolute 11.48 (H) 1.20 - 7.70 x10*3/uL    Immature Granulocytes Absolute, Automated 0.06 0.00 - 0.70 x10*3/uL    Lymphocytes Absolute 2.11 1.20 - 4.80 x10*3/uL    Monocytes Absolute 0.64  0.10 - 1.00 x10*3/uL    Eosinophils Absolute 0.10 0.00 - 0.70 x10*3/uL    Basophils Absolute 0.04 0.00 - 0.10 x10*3/uL   Lipase   Result Value Ref Range    Lipase 49 9 - 82 U/L   Lactate   Result Value Ref Range    Lactate 3.0 (H) 0.4 - 2.0 mmol/L   Human Chorionic Gonadotropin, Serum Quantitative   Result Value Ref Range    HCG, Beta-Quantitative <2 <5 mIU/mL   Lactate   Result Value Ref Range    Lactate 5.4 (HH) 0.4 - 2.0 mmol/L     STUDY:  CT ABDOMEN PELVIS W IV CONTRAST;  11/24/2023 3:56 pm      INDICATION:  .      COMPARISON:  None.      ACCESSION NUMBER(S):  [id number]      ORDERING CLINICIAN:  LINETTE ROSARIO      TECHNIQUE:  CT of the abdomen and pelvis was performed. Contiguous axial images  were obtained at 3 mm slice thickness through the abdomen and pelvis.  Coronal and sagittal reconstructions at 3 mm slice thickness were  performed.  75mL of  Omnipaque 350 was injected intravenously. Oral  contrast:  1 L of water      FINDINGS:  LOWER CHEST:  The visualized lung bases are unremarkable.      ABDOMEN:      LIVER:  The hepatic parenchyma is homogeneous without evidence of focal liver  lesions.The liver measures 20 cm in length.      SPLEEN:  The spleen is normal in size and homogeneous.      ADRENAL GLANDS:  Bilateral adrenal glands appear normal.      KIDNEYS AND URETERS:  The renal cortices are unremarkable and the renal sizes within normal  limits.      The distal ureters are obscured due to overlying distended crowded  bowel loops as described below. Otherwise no identified urinary tract  dilatation or radiodense calculi.      PANCREAS:  The pancreas appears unremarkable, there is no ductal dilatation or  masses.      GALLBLADDER:  No radiodense calculi, wall thickening or pericholecystic fluid.      BILE DUCTS:  There is no biliary dilatation or filling defects.          VESSELS:  The aorta and IVC are within normal limits.      PERITONEUM AND RETROPERITONEUM:  Trace free fluid is seen at the  cul-de-sac.  No free intraperitoneal air.      The retroperitoneum appears unremarkable, and without significant  adenopathy.      No enlarged mesenteric lymph nodes.      BOWEL:  There is mild distention fluid-filled bowel segments at the pelvis,  with decompression of more proximal and distal small bowel segments  as well as of the colon. This would be consistent with an early or  partial small bowel obstruction, possibly with obstructing point in  the right pelvis on image 133 of series 201. There is also mild  mucosal edema of a right paramedian segment best seen on axial image  122 of series 201, questionable for early ischemia. As more proximal  small bowel segments are not distended, closed loop obstruction may  be present. The remaining bowel including the appendix is otherwise  unremarkable.      PELVIS:      BLADDER:  The urinary bladder contour is smooth.      REPRODUCTIVE ORGANS:  Status post partial hysterectomy. 2.3 cm hypodensity at the right  upper pelvis on image 115 and in the left lower pelvis on image 142  probably ovaries.          BONE, ABDOMINAL WALL AND OTHER FINDINGS:  No suspicious osseous lesions are identified.      The abdominal wall soft tissues appear normal.      IMPRESSION:  1.  Mid to distal small bowel obstruction as described, including  possible closed loop obstruction.  2. Small volume of ascites.    STUDY:  US PELVIS TRANSABDOMINAL WITH TRANSVAGINAL;  11/24/2023 4:00 pm      INDICATION:  Signs/Symptoms:pelvic pain, r/o torsion.      COMPARISON:  None.      ACCESSION NUMBER(S):  [id number]      ORDERING CLINICIAN:  LINETTE ROSARIO      TECHNIQUE:  Multiple multiplanar static gray scale, color and spectral waveform  sonographic images of the pelvis were obtained.  Transabdominal and  endovaginal ultrasound was performed.      FINDINGS:  UTERUS:  Reportedly status post hysterectomy.          RIGHT ADNEXA:  Obscured by overlying bowel          LEFT ADNEXA:  The left ovary measures:   3.2 x 2.8 x 2.4 cm  Parenchymal texture:  Several follicles, otherwise homogeneous.  Normal arterial and venous flow present.          CUL DE SAC:  Small volume of free fluid.      OTHER:  Mildly dilated predominantly fluid-filled bowel segments are present  within the pelvis, several also with mild mural thickening probably  due to mucosal edema. This would suggest ileus or early or partial  obstruction.      IMPRESSION:  1.  The right ovary was not visualized.  2. Fluid-filled bowel segments as described.         Assessment/Plan   #Small bowel obstruction, possibly closed loop, concern for developing ischemia, likely secondary to adhesive disease  -  For diagnostic laparoscopy, possible lysis of adhesions, possible small bowel resection, possible conversion to open approach  -  R/B/A discussed with patient including risks of bleeding, infection, injury to intra-abdominal structures, possible stoma, possible conversion to open approach  -  Patient consents to surgery as well as blood product transfusion should it be necessary  -  Admit to surgery  -  Continue NPO         I spent 60 minutes in the professional and overall care of this patient.      Philippe Dillon MD

## 2023-11-24 NOTE — ANESTHESIA PREPROCEDURE EVALUATION
Patient: Ninoska Crain    Procedure Information       Date/Time: 11/24/23 1820    Procedure: Resection Laparoscopy Small Intestine - Possible exploratory laparotomy    Location: [facility] OR 07 / [facility]    Surgeons: Philippe Dillon MD          Vitals:    11/24/23 1826   BP: 142/70   Pulse: 95   Resp: 18   Temp: 36.6 °C (97.9 °F)   SpO2: 98%       Past Surgical History:   Procedure Laterality Date    DILATION AND CURETTAGE OF UTERUS  02/13/2018    Dilation And Curettage    LAPAROSCOPY DIAGNOSTIC / BIOPSY / ASPIRATION / LYSIS  07/31/2014    Laparoscopy (Diagnostic)    OTHER SURGICAL HISTORY  02/13/2018    Dental Surgery    OTHER SURGICAL HISTORY  09/13/2022    Ovarian cystectomy     Past Medical History:   Diagnosis Date    Major depressive disorder, recurrent, in full remission (CMS/HCC) 10/20/2021    Major depression, recurrent, full remission    Personal history of other mental and behavioral disorders 11/16/2018    History of depression    Personal history of other mental and behavioral disorders 12/14/2019    History of anxiety    Pregnancy with inconclusive fetal viability, not applicable or unspecified 03/12/2019    Encounter to determine fetal viability of pregnancy       Current Facility-Administered Medications:     lactated Ringer's bolus 1,000 mL, 1,000 mL, intravenous, Once, Constantin Vasquez MD, Last Rate: 1,000 mL/hr at 11/24/23 1812, 1,000 mL at 11/24/23 1812    Current Outpatient Medications:     sertraline (Zoloft) 100 mg tablet, TAKE 2 TABLETS BY MOUTH ONCE DAILY, Disp: 180 tablet, Rfl: 1    solifenacin (VESIcare) 10 mg tablet, TAKE 1 TABLET BY MOUTH ONCE DAILY, Disp: 30 tablet, Rfl: 6  Prior to Admission medications    Medication Sig Start Date End Date Taking? Authorizing Provider   sertraline (Zoloft) 100 mg tablet TAKE 2 TABLETS BY MOUTH ONCE DAILY 5/30/23 5/29/24  Sharita Gómez MD   solifenacin (VESIcare) 10 mg tablet TAKE 1 TABLET BY MOUTH ONCE DAILY 8/2/23 8/1/24  Betty Velazquez MD     No  "Known Allergies  Social History     Tobacco Use    Smoking status: Not on file    Smokeless tobacco: Not on file   Substance Use Topics    Alcohol use: Not on file         Chemistry    Lab Results   Component Value Date/Time     (L) 11/24/2023 1308    K 3.8 11/24/2023 1308     11/24/2023 1308    CO2 20 (L) 11/24/2023 1308    BUN 11 11/24/2023 1308    CREATININE 0.65 11/24/2023 1308    Lab Results   Component Value Date/Time    CALCIUM 9.7 11/24/2023 1308    ALKPHOS 59 11/24/2023 1308    AST 22 11/24/2023 1308    ALT 24 11/24/2023 1308    BILITOT 0.4 11/24/2023 1308          Lab Results   Component Value Date/Time    WBC 14.4 (H) 11/24/2023 1308    HGB 14.0 11/24/2023 1308    HCT 42.7 11/24/2023 1308     11/24/2023 1308     No results found for: \"PROTIME\", \"PTT\", \"INR\"  No results found for this or any previous visit (from the past 4464 hour(s)).     Relevant Problems   Infectious Disease   (+) Acute bacterial conjunctivitis of both eyes       Clinical information reviewed:    Allergies                NPO Detail:  No data recorded     Physical Exam    Airway  Mallampati: II     Cardiovascular - normal exam  Rhythm: regular  Rate: normal     Dental - normal exam     Pulmonary - normal exam     Abdominal - normal exam  Abdomen: soft             Anesthesia Plan    ASA 2 - emergent     general     The patient is not a current smoker.  Patient was previously instructed to abstain from smoking on day of procedure.  Patient did not smoke on day of procedure.  Education provided regarding risk of obstructive sleep apnea.  intravenous induction   Anesthetic plan and risks discussed with patient.  Use of blood products discussed with patient who consented to blood products.    Plan discussed with CAA.      "

## 2023-11-24 NOTE — ED PROVIDER NOTES
HPI: The patient is a 38-year-old female presenting with abdominal pain.  Acute onset at 10:00 this morning and progressively worsened since.  Pain is 10/10, achy, centered around the bellybutton and radiating diffusely across the abdomen, constant, without consistent alleviating or exacerbating factors.  Associated nausea without vomiting.  Several episodes of diarrhea yesterday.  Otherwise asymptomatic.    Review of Systems:  CONSTITUTIONAL: Denies fever, sweats, chills.  NEURO: Denies difficulty walking, numbness, weakness, tingling, headache.  HEENT: Denies sore throat, rhinorrhea, epistaxis, changes in vision.  CARDIO: Denies chest pain, palpitations.  PULM: Denies shortness of breath, cough.  GI: Reports abdominal pain, nausea, diarrhea.  Denies vomiting, constipation, melena, hematochezia.  : Denies painful urination, frequency, hematuria.  MSK: Denies recent trauma.  SKIN: Denies rash, lesions.  ENDOCRINE: Denies unexpected weight-loss.  HEME: Denies bleeding disorder.  ------------------------------------------------------------  Past medical history: Anxiety  Past surgical history: Hysterectomy, pelvic sling  Social history: Denies tobacco use, alcohol use, recreational drug usage  Family history: Reviewed and noncontributory to today's presentation unless otherwise noted.  ALLERGIES: As documented in EMR were reviewed and discussed with patient.  ------------------------------------------------------------  Physical exam:  VS: As documented in the triage note from today's date and EMR flowsheet were reviewed.  Gen: Well developed.  Writhing in pain.  Seated in bed.  Skin: Warm. Dry. Intact. No rashes or lesions.  Eyes: Pupils equally round and reactive to light. Clear sclera. EOMI.  HENT: Atraumatic appearance. Mucosal membranes moist.  CV: Regular rate and rhythm. S1, S2. No pedal edema. Warm extremities.  Resp: Nonlabored breathing Clear to auscultation bilaterally.  GI: Soft, nondistended, diffusely  tender to palpation without rebound or guarding.  MSK: Symmetric muscle bulk. No joint swelling in the extremities.  Neuro: Alert. Speech fluent. Moving all extremities. No focal deficits  Psych: Appropriate. Kempt.  ------------------------------------------------------------  Hospital Course / Medical Decision Making:  History obtained for the patient.  Records including labs, imaging, notes reviewed.  Concern for possible small bowel obstruction, appendicitis, diverticulitis, ovarian torsion.  Abdominal pain workup was initiated.  Chemistry unremarkable.  Hematology notable for leukocytosis of 14.4 with neutrophil predominance.  hCG and lipase within normal limits.  Lactate notably elevated at 3.0.  In the interim, patient was given 4 mg morphine, Zofran, and 1 L of intravenous fluids.  Despite the IV resuscitation, lactate repeated at 5.4.  Patient was given another dose of morphine with improvement in her pain.  Transvaginal ultrasound was unremarkable.  CT of the abdomen pelvis demonstrated findings concerning for closed-loop small bowel obstruction with early ischemia.  Patient was given Zosyn.  Dr. Dillon with surgery was contacted who agreed to admit the patient for surgery tonight.  Patient was amenable with this plan.    I reviewed the case with the attending ED physician. The attending ED physician agrees with the plan. Patient was counseled regarding labs, imaging, likely diagnosis, and plan. All questions were answered.     Constantin Vasquez MD  Resident  11/25/23 0022

## 2023-11-25 LAB
ANION GAP SERPL CALC-SCNC: 14 MMOL/L (ref 10–20)
BUN SERPL-MCNC: 9 MG/DL (ref 6–23)
CALCIUM SERPL-MCNC: 8.4 MG/DL (ref 8.6–10.3)
CHLORIDE SERPL-SCNC: 103 MMOL/L (ref 98–107)
CO2 SERPL-SCNC: 23 MMOL/L (ref 21–32)
CREAT SERPL-MCNC: 0.56 MG/DL (ref 0.5–1.05)
ERYTHROCYTE [DISTWIDTH] IN BLOOD BY AUTOMATED COUNT: 12.4 % (ref 11.5–14.5)
GFR SERPL CREATININE-BSD FRML MDRD: >90 ML/MIN/1.73M*2
GLUCOSE SERPL-MCNC: 128 MG/DL (ref 74–99)
HCT VFR BLD AUTO: 35.1 % (ref 36–46)
HGB BLD-MCNC: 11.7 G/DL (ref 12–16)
MCH RBC QN AUTO: 30.9 PG (ref 26–34)
MCHC RBC AUTO-ENTMCNC: 33.3 G/DL (ref 32–36)
MCV RBC AUTO: 93 FL (ref 80–100)
NRBC BLD-RTO: 0 /100 WBCS (ref 0–0)
PLATELET # BLD AUTO: 279 X10*3/UL (ref 150–450)
POTASSIUM SERPL-SCNC: 3.8 MMOL/L (ref 3.5–5.3)
RBC # BLD AUTO: 3.79 X10*6/UL (ref 4–5.2)
SODIUM SERPL-SCNC: 136 MMOL/L (ref 136–145)
WBC # BLD AUTO: 16.4 X10*3/UL (ref 4.4–11.3)

## 2023-11-25 PROCEDURE — 36415 COLL VENOUS BLD VENIPUNCTURE: CPT | Performed by: STUDENT IN AN ORGANIZED HEALTH CARE EDUCATION/TRAINING PROGRAM

## 2023-11-25 PROCEDURE — 2500000001 HC RX 250 WO HCPCS SELF ADMINISTERED DRUGS (ALT 637 FOR MEDICARE OP): Performed by: STUDENT IN AN ORGANIZED HEALTH CARE EDUCATION/TRAINING PROGRAM

## 2023-11-25 PROCEDURE — 96372 THER/PROPH/DIAG INJ SC/IM: CPT | Performed by: STUDENT IN AN ORGANIZED HEALTH CARE EDUCATION/TRAINING PROGRAM

## 2023-11-25 PROCEDURE — 2500000004 HC RX 250 GENERAL PHARMACY W/ HCPCS (ALT 636 FOR OP/ED): Performed by: STUDENT IN AN ORGANIZED HEALTH CARE EDUCATION/TRAINING PROGRAM

## 2023-11-25 PROCEDURE — 99024 POSTOP FOLLOW-UP VISIT: CPT | Performed by: STUDENT IN AN ORGANIZED HEALTH CARE EDUCATION/TRAINING PROGRAM

## 2023-11-25 PROCEDURE — 85027 COMPLETE CBC AUTOMATED: CPT | Performed by: STUDENT IN AN ORGANIZED HEALTH CARE EDUCATION/TRAINING PROGRAM

## 2023-11-25 PROCEDURE — 80048 BASIC METABOLIC PNL TOTAL CA: CPT | Performed by: STUDENT IN AN ORGANIZED HEALTH CARE EDUCATION/TRAINING PROGRAM

## 2023-11-25 PROCEDURE — 2500000004 HC RX 250 GENERAL PHARMACY W/ HCPCS (ALT 636 FOR OP/ED): Performed by: NURSE PRACTITIONER

## 2023-11-25 PROCEDURE — 2060000001 HC INTERMEDIATE ICU ROOM DAILY

## 2023-11-25 RX ORDER — MORPHINE SULFATE 4 MG/ML
4 INJECTION, SOLUTION INTRAMUSCULAR; INTRAVENOUS ONCE
Status: COMPLETED | OUTPATIENT
Start: 2023-11-25 | End: 2023-11-25

## 2023-11-25 RX ADMIN — ONDANSETRON 4 MG: 2 INJECTION INTRAMUSCULAR; INTRAVENOUS at 10:06

## 2023-11-25 RX ADMIN — KETOROLAC TROMETHAMINE 15 MG: 15 INJECTION, SOLUTION INTRAMUSCULAR; INTRAVENOUS at 06:28

## 2023-11-25 RX ADMIN — SERTRALINE HYDROCHLORIDE 100 MG: 100 TABLET ORAL at 21:08

## 2023-11-25 RX ADMIN — OXYCODONE HYDROCHLORIDE 5 MG: 5 TABLET ORAL at 10:06

## 2023-11-25 RX ADMIN — MORPHINE SULFATE 4 MG: 4 INJECTION, SOLUTION INTRAMUSCULAR; INTRAVENOUS at 00:55

## 2023-11-25 RX ADMIN — HEPARIN SODIUM 5000 UNITS: 5000 INJECTION INTRAVENOUS; SUBCUTANEOUS at 15:00

## 2023-11-25 RX ADMIN — HEPARIN SODIUM 5000 UNITS: 5000 INJECTION INTRAVENOUS; SUBCUTANEOUS at 06:28

## 2023-11-25 RX ADMIN — KETOROLAC TROMETHAMINE 15 MG: 15 INJECTION, SOLUTION INTRAMUSCULAR; INTRAVENOUS at 21:08

## 2023-11-25 RX ADMIN — ACETAMINOPHEN 650 MG: 325 TABLET ORAL at 21:08

## 2023-11-25 RX ADMIN — SERTRALINE HYDROCHLORIDE 100 MG: 100 TABLET ORAL at 09:28

## 2023-11-25 RX ADMIN — HEPARIN SODIUM 5000 UNITS: 5000 INJECTION INTRAVENOUS; SUBCUTANEOUS at 21:08

## 2023-11-25 RX ADMIN — KETOROLAC TROMETHAMINE 15 MG: 15 INJECTION, SOLUTION INTRAMUSCULAR; INTRAVENOUS at 14:59

## 2023-11-25 SDOH — ECONOMIC STABILITY: HOUSING INSECURITY
IN THE LAST 12 MONTHS, WAS THERE A TIME WHEN YOU DID NOT HAVE A STEADY PLACE TO SLEEP OR SLEPT IN A SHELTER (INCLUDING NOW)?: NO

## 2023-11-25 SDOH — ECONOMIC STABILITY: TRANSPORTATION INSECURITY

## 2023-11-25 SDOH — ECONOMIC STABILITY: TRANSPORTATION INSECURITY
IN THE PAST 12 MONTHS, HAS LACK OF TRANSPORTATION KEPT YOU FROM MEETINGS, WORK, OR FROM GETTING THINGS NEEDED FOR DAILY LIVING?: NO

## 2023-11-25 SDOH — ECONOMIC STABILITY: HOUSING INSECURITY: IN THE LAST 12 MONTHS, HOW MANY PLACES HAVE YOU LIVED?: 1

## 2023-11-25 SDOH — ECONOMIC STABILITY: INCOME INSECURITY: IN THE LAST 12 MONTHS, WAS THERE A TIME WHEN YOU WERE NOT ABLE TO PAY THE MORTGAGE OR RENT ON TIME?: NO

## 2023-11-25 SDOH — ECONOMIC STABILITY: TRANSPORTATION INSECURITY
IN THE PAST 12 MONTHS, HAS THE LACK OF TRANSPORTATION KEPT YOU FROM MEDICAL APPOINTMENTS OR FROM GETTING MEDICATIONS?: NO

## 2023-11-25 SDOH — ECONOMIC STABILITY: FOOD INSECURITY: WITHIN THE PAST 12 MONTHS, THE FOOD YOU BOUGHT JUST DIDN'T LAST AND YOU DIDN'T HAVE MONEY TO GET MORE.: NEVER TRUE

## 2023-11-25 SDOH — ECONOMIC STABILITY: FOOD INSECURITY: WITHIN THE PAST 12 MONTHS, YOU WORRIED THAT YOUR FOOD WOULD RUN OUT BEFORE YOU GOT MONEY TO BUY MORE.: NEVER TRUE

## 2023-11-25 SDOH — ECONOMIC STABILITY: FOOD INSECURITY: WITHIN THE PAST 12 MONTHS, THE FOOD YOU BOUGHT JUST DIDN’T LAST AND YOU DIDN’T HAVE MONEY TO GET MORE.: NEVER TRUE

## 2023-11-25 SDOH — ECONOMIC STABILITY: HOUSING INSECURITY: IN THE LAST 12 MONTHS, WAS THERE A TIME WHEN YOU WERE NOT ABLE TO PAY THE MORTGAGE OR RENT ON TIME?: NO

## 2023-11-25 SDOH — ECONOMIC STABILITY: TRANSPORTATION INSECURITY: IN THE PAST 12 MONTHS, HAS LACK OF TRANSPORTATION KEPT YOU FROM MEDICAL APPOINTMENTS OR FROM GETTING MEDICATIONS?: NO

## 2023-11-25 SDOH — ECONOMIC STABILITY: FOOD INSECURITY: WITHIN THE PAST 12 MONTHS, YOU WORRIED THAT YOUR FOOD WOULD RUN OUT BEFORE YOU GOT THE MONEY TO BUY MORE.: NEVER TRUE

## 2023-11-25 SDOH — ECONOMIC STABILITY: HOUSING INSECURITY: IN THE PAST 12 MONTHS HAS THE ELECTRIC, GAS, OIL, OR WATER COMPANY THREATENED TO SHUT OFF SERVICES IN YOUR HOME?: NO

## 2023-11-25 SDOH — ECONOMIC STABILITY: HOUSING INSECURITY

## 2023-11-25 SDOH — ECONOMIC STABILITY: GENERAL

## 2023-11-25 SDOH — ECONOMIC STABILITY: FOOD INSECURITY

## 2023-11-25 ASSESSMENT — PAIN SCALES - GENERAL
PAINLEVEL_OUTOF10: 0 - NO PAIN
PAINLEVEL_OUTOF10: 7
PAINLEVEL_OUTOF10: 5 - MODERATE PAIN
PAINLEVEL_OUTOF10: 5 - MODERATE PAIN
PAINLEVEL_OUTOF10: 3

## 2023-11-25 ASSESSMENT — ACTIVITIES OF DAILY LIVING (ADL): LACK_OF_TRANSPORTATION: NO

## 2023-11-25 ASSESSMENT — PAIN - FUNCTIONAL ASSESSMENT
PAIN_FUNCTIONAL_ASSESSMENT: 0-10

## 2023-11-25 ASSESSMENT — SOCIAL DETERMINANTS OF HEALTH (SDOH): IN THE PAST 12 MONTHS, HAS THE ELECTRIC, GAS, OIL, OR WATER COMPANY THREATENED TO SHUT OFF SERVICE IN YOUR HOME?: NO

## 2023-11-25 ASSESSMENT — PAIN DESCRIPTION - LOCATION: LOCATION: ABDOMEN

## 2023-11-25 NOTE — PROGRESS NOTES
"Spiritual Care Visit    Clinical Encounter Type  Visited With: Patient  Routine Visit: Introduction     Purpose of visit was to see how patient was feeling.  Patient shared her experience coming through the ED and her emergency surgery at 7 p.m. Friday night.  I felt this \"experience\" needed to be communicated to my direct supervisor and emailed Laci Raymond regarding the specifics.      Outside of that, Patient was very pleasant and feeling much better.  She was waiting for her  to come and visit this afternoon and possibly go home tomorrow.  Patient spoke highly of the folks on the hospital floor, OR, Cat Scan and Ultrasound.                                          Taxonomy  Intended Effects: Build relationship of care and support, Convey a calming presence, Demonstrate caring and concern, Lessen someone's feelings of isolation, Lessen anxiety, Helping someone feel comforted, Preserve dignity and respect, Promote sense of peace      "

## 2023-11-25 NOTE — BRIEF OP NOTE
Date: 2023  OR Location: [facility] OR    Name: Ninoska Crain YOB: 1985, Age: 38 y.o., MRN: [id number], Sex: female    Diagnosis  Pre-op Diagnosis     * SBO (small bowel obstruction) (CMS/HCC) [K56.609] Post-op Diagnosis     * SBO (small bowel obstruction) (CMS/HCC) [K56.609]     Procedures  Resection Laparoscopy Small Intestine  32605 - [place] LAPAROSCOPY SMALL INTESTINE RESCJ & ANASTOMOSIS  Diagnostic laparoscopy  Laparoscopic reduction of small bowel incarcerated in internal hernia  Laparoscopic repair of hernia defect with 3-0 V-lock suture    Surgeons      * Philippe Dillon - Primary    Resident/Fellow/Other Assistant:  Surgeon(s) and Role:  SA Olivia    Procedure Summary  Anesthesia: General  ASA: II  Anesthesia Staff: Anesthesiologist: DO PAULIE Vera-AA: ANA Haddad  Estimated Blood Loss: 5mL  Intra-op Medications:   Medication Name Total Dose   lactated Ringer's bolus 1,000 mL Cannot be calculated              Anesthesia Record               Intraprocedure I/O Totals          Intake    Propofol Drip 0.00 mL    The total shown is the total volume documented since Anesthesia Start was filed.    Total Intake 0 mL          Specimen: No specimens collected     Staff:   Circulator: Melissa Jones RN  Scrub Person: Olivia Cintron RN; Jean Jean          Findings: Limb of small bowel incarcerated within right lateral pelvic wall defect immediately adjacent to patient's prior bladder suspension repair.  Small bowel successfully reduced.  Limb of small bowel was erythematous, ecchymotic, and edematous but without evidence of necrosis or gangrene.  Small bowel successfully salvaged.  Hernia defect repair with running 3-0 V-lock stitch.    Complications:  None; patient tolerated the procedure well.     Disposition: PACU - hemodynamically stable.  Condition: stable  Specimens Collected: No specimens collected  Attending Attestation: I was present and scrubbed for the entire procedure.    Philippe KIM  [place]  Phone Number: [phone]

## 2023-11-25 NOTE — OP NOTE
Resection Laparoscopy Small Intestine Operative Note     Date: 2023  OR Location: [facility] OR    Name: Ninoska Crain YOB: 1985, Age: 38 y.o., MRN: [id number], Sex: female    Diagnosis  Pre-op Diagnosis     * SBO (small bowel obstruction) (CMS/HCC) [K56.609]     * Internal hernia [K45.8] Post-op Diagnosis     * SBO (small bowel obstruction) (CMS/HCC) [K56.609]     * Internal hernia [K45.8]     Procedures  Resection Laparoscopy Small Intestine  14351 - [place] LAPAROSCOPY SMALL INTESTINE RESCJ & ANASTOMOSIS    MS LAP,HERNIA REPAIR PROC,UNLIST [[id number]]    Diagnostic laparoscopy.  Reduction of incarcerated small bowel within pelvic internal hernia  Laparoscopic repair of internal hernia defect    Surgeons      * Philippe Dillon - Primary    Resident/Fellow/Other Assistant:  Surgeon(s) and Role:  SA Olivia    Procedure Summary  Anesthesia: General  ASA: II  Anesthesia Staff: Anesthesiologist: Chris Ortiz DO  C-AA: ANA Haddad  Estimated Blood Loss: 5mL  Intra-op Medications:   Medication Name Total Dose   BUPivacaine-EPINEPHrine (Marcaine w/EPI) 0.5 %-1:200,000 injection 5 mL   lactated Ringer's bolus 1,000 mL Cannot be calculated              Anesthesia Record               Intraprocedure I/O Totals          Intake    Propofol Drip 0.00 mL    The total shown is the total volume documented since Anesthesia Start was filed.    Total Intake 0 mL          Specimen: No specimens collected     Staff:   Circulator: Melissa Jones RN; Li Ames APRN-CNP  Scrub Person: Olivia Cintron RN; Jean Jean         Drains and/or Catheters: * None in log *    Tourniquet Times:         Implants:     Findings: Limb of small bowel incarcerated within right lateral pelvic wall defect immediately adjacent to patient's prior bladder suspension repair.  Small bowel successfully reduced.  Limb of small bowel was erythematous, ecchymotic, and edematous but without evidence of necrosis or gangrene.  Small bowel  successfully salvaged.  Hernia defect repair with running 3-0 V-lock stitch.     Indications: Ninoska Crain is an 38 y.o. female who is having surgery for SBO (small bowel obstruction) (CMS/[facility]) [K56.609].     The patient was seen in the preoperative area. The risks, benefits, complications, treatment options, non-operative alternatives, expected recovery and outcomes were discussed with the patient. The possibilities of reaction to medication, pulmonary aspiration, injury to surrounding structures, bleeding, recurrent infection, the need for additional procedures, failure to diagnose a condition, and creating a complication requiring transfusion or operation were discussed with the patient. The patient concurred with the proposed plan, giving informed consent.  The site of surgery was properly noted/marked if necessary per policy. The patient has been actively warmed in preoperative area. Preoperative antibiotics have been ordered and given within 2 hours of incision. Venous thrombosis prophylaxis have been ordered including bilateral sequential compression devices    Procedure Details: Safety checklist was performed in preoperative area confirming correct patient and correct feature.  Patient was brought to the operating room.  Sequential compression devices were applied bilateral lower extremities.  Following induction of general anesthesia, the patient was placed in supine position.  The left arm was gently tucked.  The right arm was abducted and gently fixed armboard.  A 16 Indonesian Monahan catheter was inserted under sterile technique.  The abdomen was prepped with ChloraPrep and the patient draped in the usual sterile fashion.  Timeout was performed, once again confirming correct patient and procedure.  Patient had received intravenous Zosyn antibiotic within 1 hour of arrival to the operating room.  As such, antibiotics did not require redosing.    A small stab incision was created in the skin in the left upper  quadrant using the #15 scalpel blade.  Using the 0 degree 5 mm laparoscope loaded with an optical trocar, the left upper quadrant of the abdomen was carefully entered using Optiview technique.  There were no injuries to the intra-abdominal viscera with fascial entry.  Carbon dioxide pneumoperitoneum was established.  The 0 degree laparoscope was exchanged for 30 degree laparoscope.  The patient was placed in Trendelenburg position with left side tilted down.  The 30 degree laparoscope was used to evaluate the pelvis.  There were dilated loops of small bowel within the pelvis.  Under direct laparoscopic vision, 2 additional 5 mm laparoscopic ports were inserted into the abdomen with 1 in the midline infraumbilical region and the second in the left lower quadrant.    The small bowel was retracted out of the pelvis.  Careful manipulation of the small bowel was performed.  This successfully reduced a limb of small bowel that was incarcerated within an internal hernia defect.  This successfully reduced limb of small bowel was ecchymotic, erythematous, and edematous.  However, there was no evidence of raysa necrosis or gangrene.  Decision was made to allow the small bowel time for recovery while paying attention to evaluation of the internal hernia defect.    The hernia defect was in the right pelvis immediately lateral to the patient's visible bladder suspension mesh.  There was a small amount of serous/serosanguineous fluid within the internal hernia defect which was suctioned out.  There is no evidence of purulence or bowel contents.  This defect did not communicate with the rest of the peritoneal cavity.  The hernia defect appeared to measure approximately 2 to 3 cm in largest dimension.  Decision was made to primarily repair this hernia defect to prevent recurrent herniation and small bowel obstruction.  The midline infraumbilical 5 mm port was exchanged for a 12 mm port.  A 3 oh V-Loc 6 inch stitch was introduced  into the abdomen.  The hernia defect was repaired in running fashion using the V-Loc stitch.  Once this was complete, the stitch was cut and removed from the abdomen.  Reevaluation of the repair site demonstrated that the defect was completely closed now.  Of note, the right and left ovaries were identified and unremarkable in appearance.    Attention was now directed to evaluation of the previously incarcerated limb of small bowel.  This limb of small bowel had a ready demonstrated immediate improvement in the ecchymosis and erythema.  The small bowel was also noted to demonstrate peristalsis.  Close evaluation of the surface of the small bowel demonstrated no evidence of enterotomy.  Decision was made not to proceed with small bowel resection as the small bowel had been successfully salvaged.    The 12 mm port site was closed with a figure-of-eight 0 Vicryl stitch using the Lenin Ferrara device.  The laparoscopic ports were now removed under direct laparoscopic vision with good hemostasis at the port entry sites.  Carbon dioxide pneumoperitoneum was completely released.  Preliminary count of surgical instruments, needles, sponges was performed and confirmed to be correct.  Skin incisions were reapproximated using buried 4-0 Monocryl stitch.  Final count was performed and confirmed to be correct.  The abdomen was cleansed and dried.  Skin glue was applied to the incisional sites.  The 16 Danish Monahan catheter was removed at the end of the case.  Signout was performed.  The patient was awakened and taken to recovery area in stable condition.    Complications:  None; patient tolerated the procedure well.    Disposition: PACU - hemodynamically stable.  Condition: stable         Additional Details: N/A    Attending Attestation: I was present and scrubbed for the entire procedure.    Philippe Dillon  Phone Number: [phone]

## 2023-11-25 NOTE — ANESTHESIA POSTPROCEDURE EVALUATION
Patient: April Gordon    Procedure Summary       Date: 11/24/23 Room / Location: [facility] OR  / [facility] OR    Anesthesia Start: 1853 Anesthesia Stop: 2021    Procedure: Resection Laparoscopy Small Intestine Diagnosis:       SBO (small bowel obstruction) (CMS/HCC)      (SBO (small bowel obstruction) (CMS/HCC) [K56.609])    Surgeons: Pihlippe Dillon MD Responsible Provider: Chris Ortiz DO    Anesthesia Type: general ASA Status: 2 - Emergent            Anesthesia Type: general    Vitals Value Taken Time   /80 11/24/23 2023   Temp 36.7 11/24/23 2023   Pulse 100 11/24/23 2023   Resp 16 11/24/23 2023   SpO2 99 11/24/23 2023       Anesthesia Post Evaluation    Patient location during evaluation: PACU  Patient participation: complete - patient participated  Level of consciousness: awake  Pain score: 0  Pain management: adequate  Multimodal analgesia pain management approach  Airway patency: patent  Two or more strategies used to mitigate risk of obstructive sleep apnea  Cardiovascular status: acceptable  Respiratory status: acceptable  Hydration status: acceptable  Postoperative Nausea and Vomiting: none        No notable events documented.

## 2023-11-25 NOTE — ANESTHESIA PROCEDURE NOTES
Peripheral IV  Date/Time: 11/24/2023 7:06 PM      Placement  Needle size: 20 G  Laterality: right  Location: wrist  Local anesthetic: none  Site prep: alcohol  Technique: anatomical landmarks  Attempts: 1

## 2023-11-25 NOTE — PROGRESS NOTES
"Ninoska Crain is a 38 y.o. female on day 1 of admission presenting with Internal hernia.    Subjective   Afebrile O/N.  No acute events O/N.  Abdominal pain improved.  Denies nausea or emesis.  No flatus or BM yet.  No dysuria.  Has been out of bed to bathroom.       Objective     Physical Exam  General:  A&Ox3, NAD.  Neuro:  No focal deficits.  Respiratory:  No respiratory distress.  Breathing comfortably.  CV:  Extremities warm and well-perfused, no TANYA.  GI:  Soft, non-distended, appropriately TTP, incision(s) C/D/I w/ glue.  :  Monahan catheter absent.      Last Recorded Vitals  Blood pressure 119/78, pulse 80, temperature 36.5 °C (97.7 °F), temperature source Temporal, resp. rate 14, height 1.727 m (5' 8\"), weight 86.4 kg (190 lb 7.6 oz), SpO2 99 %.  Intake/Output last 3 Shifts:  I/O last 3 completed shifts:  In: 125 (1.4 mL/kg) [I.V.:125 (1.4 mL/kg)]  Out: 450 (5.2 mL/kg) [Urine:450 (0.1 mL/kg/hr)]  Weight: 86.4 kg     Relevant Results  Results for orders placed or performed during the hospital encounter of 11/24/23 (from the past 96 hour(s))   Urine Gray Tube   Result Value Ref Range    Extra Tube Hold for add-ons.    Urinalysis with Reflex Microscopic   Result Value Ref Range    Color, Urine Yellow Straw, Yellow    Appearance, Urine Clear Clear    Specific Gravity, Urine 1.038 (N) 1.005 - 1.035    pH, Urine 8.0 5.0, 5.5, 6.0, 6.5, 7.0, 7.5, 8.0    Protein, Urine NEGATIVE NEGATIVE mg/dL    Glucose, Urine NEGATIVE NEGATIVE mg/dL    Blood, Urine NEGATIVE NEGATIVE    Ketones, Urine 5 (TRACE) (A) NEGATIVE mg/dL    Bilirubin, Urine NEGATIVE NEGATIVE    Urobilinogen, Urine <2.0 <2.0 mg/dL    Nitrite, Urine NEGATIVE NEGATIVE    Leukocyte Esterase, Urine NEGATIVE NEGATIVE   hCG, Urine, Qualitative   Result Value Ref Range    HCG, Urine NEGATIVE NEGATIVE   Comprehensive metabolic panel   Result Value Ref Range    Glucose 108 (H) 74 - 99 mg/dL    Sodium 135 (L) 136 - 145 mmol/L    Potassium 3.8 3.5 - 5.3 mmol/L    " Chloride 103 98 - 107 mmol/L    Bicarbonate 20 (L) 21 - 32 mmol/L    Anion Gap 16 10 - 20 mmol/L    Urea Nitrogen 11 6 - 23 mg/dL    Creatinine 0.65 0.50 - 1.05 mg/dL    eGFR >90 >60 mL/min/1.73m*2    Calcium 9.7 8.6 - 10.3 mg/dL    Albumin 4.5 3.4 - 5.0 g/dL    Alkaline Phosphatase 59 33 - 110 U/L    Total Protein 8.1 6.4 - 8.2 g/dL    AST 22 9 - 39 U/L    Bilirubin, Total 0.4 0.0 - 1.2 mg/dL    ALT 24 7 - 45 U/L   CBC and Auto Differential   Result Value Ref Range    WBC 14.4 (H) 4.4 - 11.3 x10*3/uL    nRBC 0.0 0.0 - 0.0 /100 WBCs    RBC 4.54 4.00 - 5.20 x10*6/uL    Hemoglobin 14.0 12.0 - 16.0 g/dL    Hematocrit 42.7 36.0 - 46.0 %    MCV 94 80 - 100 fL    MCH 30.8 26.0 - 34.0 pg    MCHC 32.8 32.0 - 36.0 g/dL    RDW 12.2 11.5 - 14.5 %    Platelets 299 150 - 450 x10*3/uL    Neutrophils % 79.6 40.0 - 80.0 %    Immature Granulocytes %, Automated 0.4 0.0 - 0.9 %    Lymphocytes % 14.6 13.0 - 44.0 %    Monocytes % 4.4 2.0 - 10.0 %    Eosinophils % 0.7 0.0 - 6.0 %    Basophils % 0.3 0.0 - 2.0 %    Neutrophils Absolute 11.48 (H) 1.20 - 7.70 x10*3/uL    Immature Granulocytes Absolute, Automated 0.06 0.00 - 0.70 x10*3/uL    Lymphocytes Absolute 2.11 1.20 - 4.80 x10*3/uL    Monocytes Absolute 0.64 0.10 - 1.00 x10*3/uL    Eosinophils Absolute 0.10 0.00 - 0.70 x10*3/uL    Basophils Absolute 0.04 0.00 - 0.10 x10*3/uL   Lipase   Result Value Ref Range    Lipase 49 9 - 82 U/L   Lactate   Result Value Ref Range    Lactate 3.0 (H) 0.4 - 2.0 mmol/L   Human Chorionic Gonadotropin, Serum Quantitative   Result Value Ref Range    HCG, Beta-Quantitative <2 <5 mIU/mL   VERIFY ABO/Rh Group Test   Result Value Ref Range    ABO TYPE A     Rh TYPE POS    Lactate   Result Value Ref Range    Lactate 5.4 (HH) 0.4 - 2.0 mmol/L   Type and Screen   Result Value Ref Range    ABO TYPE A     Rh TYPE POS     ANTIBODY SCREEN NEG    CBC   Result Value Ref Range    WBC 16.4 (H) 4.4 - 11.3 x10*3/uL    nRBC 0.0 0.0 - 0.0 /100 WBCs    RBC 3.79 (L) 4.00 - 5.20  x10*6/uL    Hemoglobin 11.7 (L) 12.0 - 16.0 g/dL    Hematocrit 35.1 (L) 36.0 - 46.0 %    MCV 93 80 - 100 fL    MCH 30.9 26.0 - 34.0 pg    MCHC 33.3 32.0 - 36.0 g/dL    RDW 12.4 11.5 - 14.5 %    Platelets 279 150 - 450 x10*3/uL   Basic metabolic panel   Result Value Ref Range    Glucose 128 (H) 74 - 99 mg/dL    Sodium 136 136 - 145 mmol/L    Potassium 3.8 3.5 - 5.3 mmol/L    Chloride 103 98 - 107 mmol/L    Bicarbonate 23 21 - 32 mmol/L    Anion Gap 14 10 - 20 mmol/L    Urea Nitrogen 9 6 - 23 mg/dL    Creatinine 0.56 0.50 - 1.05 mg/dL    eGFR >90 >60 mL/min/1.73m*2    Calcium 8.4 (L) 8.6 - 10.3 mg/dL                Assessment/Plan   Principal Problem:    Internal hernia  Active Problems:    SBO (small bowel obstruction) (CMS/HCC)    Major depressive disorder    Overactive bladder  POD#1 s/p diagnostic laparoscopy, laparoscopic reduction of incarcerated small bowel from internal hernia, primary suture repair of internal hernia  -  Expected post-operative course  -  Advance to FLD today  -  DC IVF  -  Multi-modal pain management regimen  -  DVT chemoprophylaxis  -  No abx  -  Encourage ambulation  -  Home sertraline resumed  -  Home vesicare held at this time due to contraindication with SBO diagnosis    Philippe Dillon MD   11/25/2023  9:51 AM        I spent 15 minutes in the professional and overall care of this patient.      Philippe Dillon MD

## 2023-11-25 NOTE — NURSING NOTE
Resting in bed with eyes closed, no s/s distress, call light in reach    1600 patient has ambulated throughout shift without difficulty, steady gait, tolerated full liquid diet, hopeful to go home tomorrow

## 2023-11-25 NOTE — ANESTHESIA PROCEDURE NOTES
Airway  Date/Time: 11/24/2023 7:04 PM  Urgency: elective      Staffing  Performed: ANA   Authorized by: Chris Ortiz DO    Performed by: ANA Haddad  Patient location during procedure: OR    Indications and Patient Condition  Indications for airway management: anesthesia  Spontaneous ventilation: present  Sedation level: deep  Preoxygenated: yes  Mask difficulty assessment: 0 - not attempted    Final Airway Details  Final airway type: endotracheal airway      Successful airway: ETT     Successful intubation technique: direct laryngoscopy  Blade: Fran  Blade size: #3  ETT size (mm): 7.0  Placement verified by: chest auscultation   Measured from: teeth  ETT to teeth (cm): 22  Number of attempts at approach: 1

## 2023-11-25 NOTE — CARE PLAN
The patient's goals for the shift include      Problem: Fall/Injury  Goal: Not fall by end of shift  11/25/2023 0645 by Shari Holman RN  Outcome: Progressing  11/25/2023 0046 by Shari Holman RN  Note: Patient will not fall by end of shift 11/25/23 at 0700.      Problem: Pain  Goal: Takes deep breaths with improved pain control throughout the shift  11/25/2023 0645 by Shari Holman RN  Outcome: Progressing  11/25/2023 0046 by Shari Holman RN  Note: Patient will tolerate deep breathing by end of shift 11/25/23 at 0700.      Problem: Pain  Goal: Turns in bed with improved pain control throughout the shift  11/25/2023 0645 by Shari Holman RN  Outcome: Progressing  11/25/2023 0046 by Shari Holman RN  Note: Patient will tolerate turning in bed by end of shift 11/25/23 at 0700.      Problem: Skin  Goal: Prevent/minimize sheer/friction injuries  11/25/2023 0645 by Shari Holman RN  Outcome: Progressing  11/25/2023 0046 by Shari Holman RN  Flowsheets (Taken 11/25/2023 0046)  Prevent/minimize sheer/friction injuries:   HOB 30 degrees or less   Turn/reposition every 2 hours/use positioning/transfer devices     Problem: Skin  Goal: Promote skin healing  11/25/2023 0645 by Shari Holman RN  Outcome: Progressing  11/25/2023 0046 by Shari Holman RN  Flowsheets (Taken 11/25/2023 0046)  Promote skin healing: Turn/reposition every 2 hours/use positioning/transfer devices       The clinical goals for the shift include Patient will have <5/10 pain by end of shift 11/25/23 at 0700.    Patient remains hemodynamically stable and safe throughout shift. Pain was tolerable with the scheduled IV toradol but she did need one breakthrough dose of 4 mg IV morphine. Incision sites remain dry, intact and WALESKA. Hourly rounding was performed and patient needs were met. No other acute events, will continue to monitor.

## 2023-11-26 VITALS
DIASTOLIC BLOOD PRESSURE: 58 MMHG | TEMPERATURE: 98.1 F | BODY MASS INDEX: 30.1 KG/M2 | OXYGEN SATURATION: 99 % | RESPIRATION RATE: 16 BRPM | HEIGHT: 68 IN | WEIGHT: 198.63 LBS | HEART RATE: 74 BPM | SYSTOLIC BLOOD PRESSURE: 111 MMHG

## 2023-11-26 LAB
ANION GAP SERPL CALC-SCNC: 10 MMOL/L (ref 10–20)
BUN SERPL-MCNC: 12 MG/DL (ref 6–23)
CALCIUM SERPL-MCNC: 8.1 MG/DL (ref 8.6–10.3)
CHLORIDE SERPL-SCNC: 106 MMOL/L (ref 98–107)
CO2 SERPL-SCNC: 27 MMOL/L (ref 21–32)
CREAT SERPL-MCNC: 0.65 MG/DL (ref 0.5–1.05)
ERYTHROCYTE [DISTWIDTH] IN BLOOD BY AUTOMATED COUNT: 12.4 % (ref 11.5–14.5)
GFR SERPL CREATININE-BSD FRML MDRD: >90 ML/MIN/1.73M*2
GLUCOSE SERPL-MCNC: 91 MG/DL (ref 74–99)
HCT VFR BLD AUTO: 33.6 % (ref 36–46)
HGB BLD-MCNC: 11.1 G/DL (ref 12–16)
MCH RBC QN AUTO: 31.3 PG (ref 26–34)
MCHC RBC AUTO-ENTMCNC: 33 G/DL (ref 32–36)
MCV RBC AUTO: 95 FL (ref 80–100)
NRBC BLD-RTO: 0 /100 WBCS (ref 0–0)
PLATELET # BLD AUTO: 232 X10*3/UL (ref 150–450)
POTASSIUM SERPL-SCNC: 3.5 MMOL/L (ref 3.5–5.3)
RBC # BLD AUTO: 3.55 X10*6/UL (ref 4–5.2)
SODIUM SERPL-SCNC: 139 MMOL/L (ref 136–145)
WBC # BLD AUTO: 7.9 X10*3/UL (ref 4.4–11.3)

## 2023-11-26 PROCEDURE — 36415 COLL VENOUS BLD VENIPUNCTURE: CPT | Performed by: STUDENT IN AN ORGANIZED HEALTH CARE EDUCATION/TRAINING PROGRAM

## 2023-11-26 PROCEDURE — 0DQV4ZZ REPAIR MESENTERY, PERCUTANEOUS ENDOSCOPIC APPROACH: ICD-10-PCS | Performed by: STUDENT IN AN ORGANIZED HEALTH CARE EDUCATION/TRAINING PROGRAM

## 2023-11-26 PROCEDURE — 85027 COMPLETE CBC AUTOMATED: CPT | Performed by: STUDENT IN AN ORGANIZED HEALTH CARE EDUCATION/TRAINING PROGRAM

## 2023-11-26 PROCEDURE — 96372 THER/PROPH/DIAG INJ SC/IM: CPT | Performed by: STUDENT IN AN ORGANIZED HEALTH CARE EDUCATION/TRAINING PROGRAM

## 2023-11-26 PROCEDURE — 99231 SBSQ HOSP IP/OBS SF/LOW 25: CPT | Performed by: STUDENT IN AN ORGANIZED HEALTH CARE EDUCATION/TRAINING PROGRAM

## 2023-11-26 PROCEDURE — 2500000004 HC RX 250 GENERAL PHARMACY W/ HCPCS (ALT 636 FOR OP/ED): Performed by: STUDENT IN AN ORGANIZED HEALTH CARE EDUCATION/TRAINING PROGRAM

## 2023-11-26 PROCEDURE — 2500000004 HC RX 250 GENERAL PHARMACY W/ HCPCS (ALT 636 FOR OP/ED): Performed by: NURSE PRACTITIONER

## 2023-11-26 PROCEDURE — 0DS84ZZ REPOSITION SMALL INTESTINE, PERCUTANEOUS ENDOSCOPIC APPROACH: ICD-10-PCS | Performed by: STUDENT IN AN ORGANIZED HEALTH CARE EDUCATION/TRAINING PROGRAM

## 2023-11-26 PROCEDURE — 80051 ELECTROLYTE PANEL: CPT | Performed by: STUDENT IN AN ORGANIZED HEALTH CARE EDUCATION/TRAINING PROGRAM

## 2023-11-26 PROCEDURE — 99024 POSTOP FOLLOW-UP VISIT: CPT | Performed by: STUDENT IN AN ORGANIZED HEALTH CARE EDUCATION/TRAINING PROGRAM

## 2023-11-26 PROCEDURE — 0DB84ZZ EXCISION OF SMALL INTESTINE, PERCUTANEOUS ENDOSCOPIC APPROACH: ICD-10-PCS | Performed by: STUDENT IN AN ORGANIZED HEALTH CARE EDUCATION/TRAINING PROGRAM

## 2023-11-26 RX ORDER — OXYCODONE AND ACETAMINOPHEN 5; 325 MG/1; MG/1
1 TABLET ORAL EVERY 6 HOURS PRN
Qty: 5 TABLET | Refills: 0 | Status: SHIPPED | OUTPATIENT
Start: 2023-11-26 | End: 2023-11-28

## 2023-11-26 RX ADMIN — ACETAMINOPHEN 650 MG: 325 TABLET ORAL at 06:03

## 2023-11-26 RX ADMIN — HEPARIN SODIUM 5000 UNITS: 5000 INJECTION INTRAVENOUS; SUBCUTANEOUS at 06:03

## 2023-11-26 RX ADMIN — KETOROLAC TROMETHAMINE 15 MG: 15 INJECTION, SOLUTION INTRAMUSCULAR; INTRAVENOUS at 06:03

## 2023-11-26 RX ADMIN — SERTRALINE HYDROCHLORIDE 100 MG: 100 TABLET ORAL at 09:36

## 2023-11-26 RX ADMIN — KETOROLAC TROMETHAMINE 15 MG: 15 INJECTION, SOLUTION INTRAMUSCULAR; INTRAVENOUS at 13:45

## 2023-11-26 ASSESSMENT — PAIN - FUNCTIONAL ASSESSMENT
PAIN_FUNCTIONAL_ASSESSMENT: 0-10
PAIN_FUNCTIONAL_ASSESSMENT: 0-10

## 2023-11-26 ASSESSMENT — PAIN SCALES - GENERAL
PAINLEVEL_OUTOF10: 2
PAINLEVEL_OUTOF10: 0 - NO PAIN

## 2023-11-26 NOTE — DISCHARGE SUMMARY
Discharge Diagnosis  Internal hernia  Incarcerated SBO    Issues Requiring Follow-Up  Post-operative follow-up    Test Results Pending At Discharge  Pending Labs       No current pending labs.            Hospital Course   Patient presented to [facility] for evaluation of acute onset abdominal pain.  Evaluation was notable for leukocytosis and CT of the abdomen and pelvis demonstrated findings concerning for small bowel obstruction, closed-loop.  Surgery was consulted and patient was taken to the operating room emergently for diagnostic laparoscopy, laparoscopic reduction of incarcerated internal hernia, and primary repair of internal hernia with suture.  Patient tolerated the surgery well and postoperatively was taken to the regular nursing floor.  Patient was initiated on ERAS protocol with clear liquid diet, low intravenous fluid rate, multimodal pain management regimen, and DVT chemoprophylaxis.  Home sertraline was resumed.  Home Vesicare was held.  On postoperative day 1, patient was advanced to full liquid diet and intravenous fluids were discontinued.  Bowel function returned with passage of flatus.  On postoperative day 2, patient was advanced to a low fiber diet.  Patient was deemed appropriate for discharge and is discharged to home with instructions for wound care, activity, diet, pain control, and follow-up.    Pertinent Physical Exam At Time of Discharge  Physical Exam  See progress note from today    Home Medications     Medication List      START taking these medications     oxyCODONE-acetaminophen 5-325 mg tablet; Commonly known as: Percocet;   Take 1 tablet by mouth every 6 hours if needed for severe pain (7 - 10)   for up to 2 days.     CHANGE how you take these medications     sertraline 100 mg tablet; Commonly known as: Zoloft; TAKE 2 TABLETS BY   MOUTH ONCE DAILY; What changed: how much to take, when to take this     CONTINUE taking these medications     solifenacin 10 mg tablet;  Commonly known as: VESIcare; TAKE 1 TABLET BY   MOUTH ONCE DAILY       Outpatient Follow-Up  Future Appointments   Date Time Provider Department Center   12/18/2023  2:30 PM DELMER Abrams [id number] [place]   4/9/2024  3:00 PM DELMER Puente [id number]       Philippe Dillon MD

## 2023-11-26 NOTE — NURSING NOTE
1000 Dr. Dillon in to see patient, discharge orders received, pt's ride will be here around 1400, complains of discomfort, ambulating in halls without difficulty

## 2023-11-26 NOTE — CARE PLAN
The patient's goals for the shift include      Problem: Fall/Injury  Goal: Not fall by end of shift  Outcome: Progressing     Problem: Pain  Goal: Takes deep breaths with improved pain control throughout the shift  Outcome: Progressing       The clinical goals for the shift include Patient will have <5/10 pain by end of shift 11/26/23 at 0700.    Patient remains hemodynamically stable and safe throughout shift. Pain was tolerable throughout the night with the scheduled IV toradol and PRN tylenol. Hourly rounding was performed and patient needs were met. No other acute events, will continue to monitor.

## 2023-11-26 NOTE — PROGRESS NOTES
"Ninoska Crain is a 38 y.o. female on day 2 of admission presenting with Internal hernia.    Subjective   Afebrile O/N.  No acute events O/N.  Abdominal pain continues to improve; soreness from incisional sites.  Tolerated FLD yesterday.  Denies nausea or emesis.  Passing flatus.  No dysuria.  Ambulated in halls yesterday.       Objective     Physical Exam  General:  A&Ox3, NAD.  Neuro:  No focal deficits.  Respiratory:  No respiratory distress.  Breathing comfortably.  CV:  Extremities warm and well-perfused, no TANYA.  GI:  Soft, non-distended, appropriately TTP, incision(s) C/D/I w/ glue.  :  Monahan catheter absent.      Last Recorded Vitals  Blood pressure 123/64, pulse 74, temperature 36.1 °C (97 °F), temperature source Temporal, resp. rate 18, height 1.727 m (5' 8\"), weight 90.1 kg (198 lb 10.2 oz), SpO2 95 %.  Intake/Output last 3 Shifts:  I/O last 3 completed shifts:  In: 845 (9.4 mL/kg) [P.O.:720; I.V.:125 (1.4 mL/kg)]  Out: 2950 (32.7 mL/kg) [Urine:2950 (0.9 mL/kg/hr)]  Weight: 90.1 kg     Relevant Results  Results for orders placed or performed during the hospital encounter of 11/24/23 (from the past 96 hour(s))   Urine Gray Tube   Result Value Ref Range    Extra Tube Hold for add-ons.    Urinalysis with Reflex Microscopic   Result Value Ref Range    Color, Urine Yellow Straw, Yellow    Appearance, Urine Clear Clear    Specific Gravity, Urine 1.038 (N) 1.005 - 1.035    pH, Urine 8.0 5.0, 5.5, 6.0, 6.5, 7.0, 7.5, 8.0    Protein, Urine NEGATIVE NEGATIVE mg/dL    Glucose, Urine NEGATIVE NEGATIVE mg/dL    Blood, Urine NEGATIVE NEGATIVE    Ketones, Urine 5 (TRACE) (A) NEGATIVE mg/dL    Bilirubin, Urine NEGATIVE NEGATIVE    Urobilinogen, Urine <2.0 <2.0 mg/dL    Nitrite, Urine NEGATIVE NEGATIVE    Leukocyte Esterase, Urine NEGATIVE NEGATIVE   hCG, Urine, Qualitative   Result Value Ref Range    HCG, Urine NEGATIVE NEGATIVE   Comprehensive metabolic panel   Result Value Ref Range    Glucose 108 (H) 74 - 99 mg/dL    " Sodium 135 (L) 136 - 145 mmol/L    Potassium 3.8 3.5 - 5.3 mmol/L    Chloride 103 98 - 107 mmol/L    Bicarbonate 20 (L) 21 - 32 mmol/L    Anion Gap 16 10 - 20 mmol/L    Urea Nitrogen 11 6 - 23 mg/dL    Creatinine 0.65 0.50 - 1.05 mg/dL    eGFR >90 >60 mL/min/1.73m*2    Calcium 9.7 8.6 - 10.3 mg/dL    Albumin 4.5 3.4 - 5.0 g/dL    Alkaline Phosphatase 59 33 - 110 U/L    Total Protein 8.1 6.4 - 8.2 g/dL    AST 22 9 - 39 U/L    Bilirubin, Total 0.4 0.0 - 1.2 mg/dL    ALT 24 7 - 45 U/L   CBC and Auto Differential   Result Value Ref Range    WBC 14.4 (H) 4.4 - 11.3 x10*3/uL    nRBC 0.0 0.0 - 0.0 /100 WBCs    RBC 4.54 4.00 - 5.20 x10*6/uL    Hemoglobin 14.0 12.0 - 16.0 g/dL    Hematocrit 42.7 36.0 - 46.0 %    MCV 94 80 - 100 fL    MCH 30.8 26.0 - 34.0 pg    MCHC 32.8 32.0 - 36.0 g/dL    RDW 12.2 11.5 - 14.5 %    Platelets 299 150 - 450 x10*3/uL    Neutrophils % 79.6 40.0 - 80.0 %    Immature Granulocytes %, Automated 0.4 0.0 - 0.9 %    Lymphocytes % 14.6 13.0 - 44.0 %    Monocytes % 4.4 2.0 - 10.0 %    Eosinophils % 0.7 0.0 - 6.0 %    Basophils % 0.3 0.0 - 2.0 %    Neutrophils Absolute 11.48 (H) 1.20 - 7.70 x10*3/uL    Immature Granulocytes Absolute, Automated 0.06 0.00 - 0.70 x10*3/uL    Lymphocytes Absolute 2.11 1.20 - 4.80 x10*3/uL    Monocytes Absolute 0.64 0.10 - 1.00 x10*3/uL    Eosinophils Absolute 0.10 0.00 - 0.70 x10*3/uL    Basophils Absolute 0.04 0.00 - 0.10 x10*3/uL   Lipase   Result Value Ref Range    Lipase 49 9 - 82 U/L   Lactate   Result Value Ref Range    Lactate 3.0 (H) 0.4 - 2.0 mmol/L   Human Chorionic Gonadotropin, Serum Quantitative   Result Value Ref Range    HCG, Beta-Quantitative <2 <5 mIU/mL   VERIFY ABO/Rh Group Test   Result Value Ref Range    ABO TYPE A     Rh TYPE POS    Lactate   Result Value Ref Range    Lactate 5.4 (HH) 0.4 - 2.0 mmol/L   Type and Screen   Result Value Ref Range    ABO TYPE A     Rh TYPE POS     ANTIBODY SCREEN NEG    CBC   Result Value Ref Range    WBC 16.4 (H) 4.4 - 11.3  x10*3/uL    nRBC 0.0 0.0 - 0.0 /100 WBCs    RBC 3.79 (L) 4.00 - 5.20 x10*6/uL    Hemoglobin 11.7 (L) 12.0 - 16.0 g/dL    Hematocrit 35.1 (L) 36.0 - 46.0 %    MCV 93 80 - 100 fL    MCH 30.9 26.0 - 34.0 pg    MCHC 33.3 32.0 - 36.0 g/dL    RDW 12.4 11.5 - 14.5 %    Platelets 279 150 - 450 x10*3/uL   Basic metabolic panel   Result Value Ref Range    Glucose 128 (H) 74 - 99 mg/dL    Sodium 136 136 - 145 mmol/L    Potassium 3.8 3.5 - 5.3 mmol/L    Chloride 103 98 - 107 mmol/L    Bicarbonate 23 21 - 32 mmol/L    Anion Gap 14 10 - 20 mmol/L    Urea Nitrogen 9 6 - 23 mg/dL    Creatinine 0.56 0.50 - 1.05 mg/dL    eGFR >90 >60 mL/min/1.73m*2    Calcium 8.4 (L) 8.6 - 10.3 mg/dL   Basic metabolic panel   Result Value Ref Range    Glucose 91 74 - 99 mg/dL    Sodium 139 136 - 145 mmol/L    Potassium 3.5 3.5 - 5.3 mmol/L    Chloride 106 98 - 107 mmol/L    Bicarbonate 27 21 - 32 mmol/L    Anion Gap 10 10 - 20 mmol/L    Urea Nitrogen 12 6 - 23 mg/dL    Creatinine 0.65 0.50 - 1.05 mg/dL    eGFR >90 >60 mL/min/1.73m*2    Calcium 8.1 (L) 8.6 - 10.3 mg/dL   CBC   Result Value Ref Range    WBC 7.9 4.4 - 11.3 x10*3/uL    nRBC 0.0 0.0 - 0.0 /100 WBCs    RBC 3.55 (L) 4.00 - 5.20 x10*6/uL    Hemoglobin 11.1 (L) 12.0 - 16.0 g/dL    Hematocrit 33.6 (L) 36.0 - 46.0 %    MCV 95 80 - 100 fL    MCH 31.3 26.0 - 34.0 pg    MCHC 33.0 32.0 - 36.0 g/dL    RDW 12.4 11.5 - 14.5 %    Platelets 232 150 - 450 x10*3/uL                Assessment/Plan   Principal Problem:    Internal hernia  Active Problems:    SBO (small bowel obstruction) (CMS/HCC)    Major depressive disorder    Overactive bladder  POD#2 s/p diagnostic laparoscopy, laparoscopic reduction of incarcerated small bowel from internal hernia, primary suture repair of internal hernia  -  Expected post-operative course  -  Advance to low fiber diet today  -  IVF stopped 11/25  -  Multi-modal pain management regimen  -  DVT chemoprophylaxis  -  No abx  -  Encourage ambulation  -  Home sertraline  resumed  -  [place] vesicare at [place]  -  DC to home today    Philippe Dillon MD   11/26/2023  9:52 AM        I spent 15 minutes in the professional and overall care of this patient.      Philippe Dillon MD

## 2023-11-26 NOTE — DISCHARGE INSTRUCTIONS
WOUND CARE:  OK to shower 24 hours after surgery.  No tub bath/soaking/swimming until cleared at outpatient appointment.  Do not remove any staples or stitches; if these are present they will be removed at outpatient appointment.  Any glue will peel away on its own.    PAIN CONTROL:  Can utilize prescribed percocet as needed.  Do not combine percocet with tylenol to avoid exceeding safe daily recommended intake of acetaminophen.  Can utilize ibuprofen.    ACTIVITY:  No heavy lifting (>10-15lbs) or strenuous activity for 4 weeks from date of surgery.  OK to walk and take stairs at slow pace.  Do not drive or operative heavy machinery for at least first 24 hours after surgery; if you do not feel able to safely operate after first 24 hours or are taking narcotics (ie percocet or vicodin) then do not operative heavy machinery or drive.    DIET:  Soft low fiber diet is recommended until your follow-up appointment.    FOLLOW-UP:   Please call office at [phone] to schedule follow-up appointment for approximately two weeks from date of surgery.

## 2023-11-27 ENCOUNTER — PATIENT OUTREACH (OUTPATIENT)
Dept: CARE COORDINATION | Facility: CLINIC | Age: 38
End: 2023-11-27

## 2023-11-27 ENCOUNTER — DOCUMENTATION (OUTPATIENT)
Dept: OBSTETRICS AND GYNECOLOGY | Facility: CLINIC | Age: 38
End: 2023-11-27

## 2023-11-27 NOTE — PROGRESS NOTES
Events over the weekend discussed with patient and op note from Dr. Dillon reviewed.  Small internal hernia lateral to scpxy mesh was cause of obstruction despite being closed at the time of original surgery.    Appreciate Dr. Dillon's excellent care of this patient and overall excellent outcome.    Pt resting at home and doing well but sore.    SERAFIN Velazquez MD

## 2023-11-27 NOTE — PROGRESS NOTES
[facility] member [employer] outreach.    Spoke with member. I introduced myself and purpose of call.  Confirmed : yes  Acute abdominal pain, SBO, closed loop. Laparoscopic reduction.   No new or worsening symptoms. Pain controlled with medication. Oxycodone taken at bed time, using tylenol and ibuprofen during day. Denies N/D/F/SOB.  Member has Rx given at discharge.  No BM since being home. Endorses passing gas.   Confirms increased fluid intake.   PCP follow up scheduled for . Encouraged her to call and let them know of surgery. Is doing to call today and schedule follow up with surgery.  No questions or concerns at time of call. Available if needed.

## 2023-12-07 ENCOUNTER — PHARMACY VISIT (OUTPATIENT)
Dept: PHARMACY | Facility: CLINIC | Age: 38
End: 2023-12-07
Payer: COMMERCIAL

## 2023-12-07 PROCEDURE — RXMED WILLOW AMBULATORY MEDICATION CHARGE

## 2023-12-11 NOTE — PROGRESS NOTES
RODOLFO Crain is a 38 y.o. female who presented to [facility] on 11/24 for acute onset abdominal pain. CT a/p demonstrated findings concerning for closed loop SBO. She is s/p ex-lap, reduction of incarcerated internal hernia and primary repair of internal hernia with suture.     Patient presents today for postoperative follow-up.  Pain is controlled.  She is utilizing Tylenol as needed.  Tolerating diet without nausea or emesis.  She is moving her bowels without difficulty.  Denies hematochezia, melena, diarrhea.  Denies dysuria or hematuria.  Denies fevers or chills.  Without any incisional complaints.  She is back to work.    Non-smoker/No ETOH/No Illicit drug use  No family history of CRC or IBD  PSH:  3/17/2023: Robotic-assisted HERMELINDO, BS, SCPXY, MUS, perineorrhaphy, and cystoscopy.  6/16/2023: Midurethral sling lysis and excision of perineal skin tag.   Employment:     Past Medical History:   Diagnosis Date    Major depressive disorder, recurrent, in full remission (CMS/[facility]) 10/20/2021    Major depression, recurrent, full remission    Personal history of other mental and behavioral disorders 11/16/2018    History of depression    Personal history of other mental and behavioral disorders 12/14/2019    History of anxiety    Pregnancy with inconclusive fetal viability, not applicable or unspecified 03/12/2019    Encounter to determine fetal viability of pregnancy       Past Surgical History:   Procedure Laterality Date    DILATION AND CURETTAGE OF UTERUS  02/13/2018    Dilation And Curettage    LAPAROSCOPY DIAGNOSTIC / BIOPSY / ASPIRATION / LYSIS  07/31/2014    Laparoscopy (Diagnostic)    OTHER SURGICAL HISTORY  02/13/2018    Dental Surgery    OTHER SURGICAL HISTORY  09/13/2022    Ovarian cystectomy       No Known Allergies    She is feeling well since the surgery.  No hematuria or pain with urination.  She is eating and drinking well with no n/v.  NO c/o any fever/chills.    Review of Systems   Constitutional:   Negative for activity change, appetite change, chills and fever.   Gastrointestinal:  Negative for abdominal distention, abdominal pain, anal bleeding, blood in stool, constipation, diarrhea, nausea, rectal pain and vomiting.   Genitourinary:  Negative for difficulty urinating, dysuria, frequency and hematuria.   Neurological:  Negative for dizziness and light-headedness.   All other systems reviewed and are negative.      Physical Exam  Constitutional:       Appearance: Normal appearance.   HENT:      Head: Normocephalic and atraumatic.   Pulmonary:      Effort: Pulmonary effort is normal.   Abdominal:      General: Abdomen is flat. There is no distension.      Palpations: Abdomen is soft. There is no mass.      Tenderness: There is no abdominal tenderness. There is no guarding or rebound.      Hernia: No hernia is present.      Comments: Laparoscopic incisions clean dry intact.   Musculoskeletal:         General: Normal range of motion.   Skin:     General: Skin is warm and dry.   Neurological:      General: No focal deficit present.      Mental Status: She is alert and oriented to person, place, and time.   Psychiatric:         Mood and Affect: Mood normal.         Behavior: Behavior normal.         Assessment and Plan:   # Small bowel obstruction secondary to internal hernia status post laparoscopic reduction of hernia and repair of internal hernia defect  -Expected postoperative course  -No heavy lifting (greater than 10 to 15 pounds) or strenuous activities until 4 weeks postop  -No dietary restrictions  -Follow-up as needed    Philippe Dillon MD   12/12/2023  8:47 AM

## 2023-12-12 ENCOUNTER — OFFICE VISIT (OUTPATIENT)
Dept: SURGERY | Facility: CLINIC | Age: 38
End: 2023-12-12
Payer: COMMERCIAL

## 2023-12-12 VITALS
BODY MASS INDEX: 30.01 KG/M2 | DIASTOLIC BLOOD PRESSURE: 74 MMHG | SYSTOLIC BLOOD PRESSURE: 115 MMHG | HEART RATE: 72 BPM | HEIGHT: 68 IN | WEIGHT: 198 LBS

## 2023-12-12 DIAGNOSIS — K45.8 INTERNAL HERNIA: ICD-10-CM

## 2023-12-12 DIAGNOSIS — K56.609 SBO (SMALL BOWEL OBSTRUCTION) (MULTI): Primary | ICD-10-CM

## 2023-12-12 PROCEDURE — 99024 POSTOP FOLLOW-UP VISIT: CPT | Performed by: STUDENT IN AN ORGANIZED HEALTH CARE EDUCATION/TRAINING PROGRAM

## 2023-12-12 ASSESSMENT — ENCOUNTER SYMPTOMS
HEMATURIA: 0
VOMITING: 0
FEVER: 0
CONSTIPATION: 0
RECTAL PAIN: 0
ANAL BLEEDING: 0
DIFFICULTY URINATING: 0
ACTIVITY CHANGE: 0
NAUSEA: 0
LIGHT-HEADEDNESS: 0
CHILLS: 0
DIZZINESS: 0
BLOOD IN STOOL: 0
FREQUENCY: 0
DIARRHEA: 0
ABDOMINAL PAIN: 0
DYSURIA: 0
APPETITE CHANGE: 0
ABDOMINAL DISTENTION: 0

## 2023-12-15 ENCOUNTER — TELEPHONE (OUTPATIENT)
Dept: PRIMARY CARE | Facility: CLINIC | Age: 38
End: 2023-12-15

## 2023-12-15 DIAGNOSIS — F32.9 MAJOR DEPRESSIVE DISORDER, REMISSION STATUS UNSPECIFIED, UNSPECIFIED WHETHER RECURRENT: Chronic | ICD-10-CM

## 2023-12-16 RX ORDER — SERTRALINE HYDROCHLORIDE 100 MG/1
200 TABLET, FILM COATED ORAL DAILY
Qty: 180 TABLET | Refills: 1 | Status: SHIPPED | OUTPATIENT
Start: 2023-12-16 | End: 2024-01-08 | Stop reason: SDUPTHER

## 2023-12-18 ENCOUNTER — APPOINTMENT (OUTPATIENT)
Dept: PRIMARY CARE | Facility: CLINIC | Age: 38
End: 2023-12-18

## 2024-01-08 ENCOUNTER — OFFICE VISIT (OUTPATIENT)
Dept: PRIMARY CARE | Facility: CLINIC | Age: 39
End: 2024-01-08
Payer: COMMERCIAL

## 2024-01-08 ENCOUNTER — PHARMACY VISIT (OUTPATIENT)
Dept: PHARMACY | Facility: CLINIC | Age: 39
End: 2024-01-08
Payer: COMMERCIAL

## 2024-01-08 VITALS
TEMPERATURE: 97.8 F | WEIGHT: 193.2 LBS | DIASTOLIC BLOOD PRESSURE: 72 MMHG | OXYGEN SATURATION: 98 % | BODY MASS INDEX: 29.28 KG/M2 | HEART RATE: 80 BPM | HEIGHT: 68 IN | SYSTOLIC BLOOD PRESSURE: 118 MMHG

## 2024-01-08 DIAGNOSIS — E55.9 VITAMIN D INSUFFICIENCY: ICD-10-CM

## 2024-01-08 DIAGNOSIS — Z13.220 LIPID SCREENING: ICD-10-CM

## 2024-01-08 DIAGNOSIS — F32.9 MAJOR DEPRESSIVE DISORDER, REMISSION STATUS UNSPECIFIED, UNSPECIFIED WHETHER RECURRENT: Chronic | ICD-10-CM

## 2024-01-08 DIAGNOSIS — E66.09 CLASS 1 OBESITY DUE TO EXCESS CALORIES WITHOUT SERIOUS COMORBIDITY IN ADULT, UNSPECIFIED BMI: ICD-10-CM

## 2024-01-08 DIAGNOSIS — R63.8 UNABLE TO LOSE WEIGHT: ICD-10-CM

## 2024-01-08 DIAGNOSIS — R53.83 OTHER FATIGUE: ICD-10-CM

## 2024-01-08 DIAGNOSIS — Z00.00 PHYSICAL EXAM, ANNUAL: Primary | ICD-10-CM

## 2024-01-08 PROBLEM — F33.0 MAJOR DEPRESSIVE DISORDER, RECURRENT EPISODE, MILD (CMS-HCC): Status: ACTIVE | Noted: 2024-01-08

## 2024-01-08 PROBLEM — N81.4 PROLAPSE, UTEROVAGINAL: Status: ACTIVE | Noted: 2024-01-08

## 2024-01-08 PROBLEM — N39.8 VOIDING DYSFUNCTION: Status: ACTIVE | Noted: 2024-01-08

## 2024-01-08 PROBLEM — K13.0 DISEASES OF LIPS: Status: ACTIVE | Noted: 2021-11-16

## 2024-01-08 PROBLEM — G43.001 MIGRAINE WITHOUT AURA AND WITH STATUS MIGRAINOSUS, NOT INTRACTABLE: Status: ACTIVE | Noted: 2024-01-08

## 2024-01-08 PROBLEM — N39.3 FEMALE STRESS INCONTINENCE: Status: ACTIVE | Noted: 2024-01-08

## 2024-01-08 PROBLEM — M62.59 ATROPHY OF MUSCLE OF MULTIPLE SITES: Status: ACTIVE | Noted: 2024-01-08

## 2024-01-08 PROBLEM — R55 VASOVAGAL SYNCOPE: Status: ACTIVE | Noted: 2024-01-08

## 2024-01-08 PROBLEM — F41.9 ANXIETY DISORDER: Status: ACTIVE | Noted: 2024-01-08

## 2024-01-08 PROCEDURE — RXMED WILLOW AMBULATORY MEDICATION CHARGE

## 2024-01-08 PROCEDURE — 99395 PREV VISIT EST AGE 18-39: CPT | Performed by: NURSE PRACTITIONER

## 2024-01-08 PROCEDURE — 1036F TOBACCO NON-USER: CPT | Performed by: NURSE PRACTITIONER

## 2024-01-08 RX ORDER — BISMUTH SUBSALICYLATE 262 MG
1 TABLET,CHEWABLE ORAL DAILY
COMMUNITY

## 2024-01-08 RX ORDER — PHENTERMINE HYDROCHLORIDE 37.5 MG/1
37.5 TABLET ORAL
Qty: 30 TABLET | Refills: 0 | Status: SHIPPED | OUTPATIENT
Start: 2024-01-08 | End: 2024-02-05 | Stop reason: SDUPTHER

## 2024-01-08 RX ORDER — IBUPROFEN 100 MG/5ML
1000 SUSPENSION, ORAL (FINAL DOSE FORM) ORAL DAILY
COMMUNITY

## 2024-01-08 RX ORDER — ACETAMINOPHEN 500 MG
1 TABLET ORAL DAILY
COMMUNITY
Start: 2018-12-27

## 2024-01-08 RX ORDER — SERTRALINE HYDROCHLORIDE 100 MG/1
200 TABLET, FILM COATED ORAL DAILY
Qty: 180 TABLET | Refills: 3 | Status: SHIPPED | OUTPATIENT
Start: 2024-01-08 | End: 2024-02-05 | Stop reason: SDUPTHER

## 2024-01-08 RX ORDER — GLUCOSAM/CHONDRO/HERB 149/HYAL 750-100 MG
TABLET ORAL 2 TIMES DAILY
COMMUNITY

## 2024-01-08 ASSESSMENT — ENCOUNTER SYMPTOMS
NAUSEA: 0
RHINORRHEA: 0
CHILLS: 0
DYSURIA: 0
BACK PAIN: 0
VOMITING: 0
EYE PAIN: 0
PALPITATIONS: 0
FATIGUE: 0
ADENOPATHY: 0
WHEEZING: 0
SINUS PRESSURE: 0
MYALGIAS: 0
DECREASED CONCENTRATION: 0
HEADACHES: 0
COLOR CHANGE: 0
SHORTNESS OF BREATH: 0
COUGH: 0
DIFFICULTY URINATING: 0
DIARRHEA: 0
EYE ITCHING: 0
CONSTIPATION: 0
FEVER: 0
NERVOUS/ANXIOUS: 0
JOINT SWELLING: 0
SORE THROAT: 0
EYE DISCHARGE: 0

## 2024-01-08 ASSESSMENT — PAIN SCALES - GENERAL: PAINLEVEL: 0-NO PAIN

## 2024-01-08 NOTE — PATIENT INSTRUCTIONS
Patient to start adipex and continue zoloft as ordered. Have fasting labs drawn, and we will call with results when available. Follow-up in 1 month, or sooner if needed. Call the office if any problems or concerns in the meantime.

## 2024-01-08 NOTE — PROGRESS NOTES
"Subjective   Patient ID: April Paras is a 38 y.o. female who presents for Annual Exam.    Patient asking for refill for zoloft due to last prescription going to local pharmacy instead on  pharmacy     Well Adult Physical   Patient here for a comprehensive physical exam.The patient reports no problems    Do you take any herbs or supplements that were not prescribed by a doctor? yes   Are you taking calcium supplements? no   Are you taking aspirin daily? no     History:  LMP: No LMP recorded.  Menopause at n/a years  Last pap date: hysterectomy   Abnormal pap? no  : 2  Para: 2         Review of Systems   Constitutional:  Negative for chills, fatigue and fever.   HENT:  Negative for congestion, ear pain, rhinorrhea, sinus pressure and sore throat.    Eyes:  Negative for pain, discharge and itching.   Respiratory:  Negative for cough, shortness of breath and wheezing.    Cardiovascular:  Negative for chest pain and palpitations.   Gastrointestinal:  Negative for constipation, diarrhea, nausea and vomiting.   Genitourinary:  Negative for difficulty urinating and dysuria.   Musculoskeletal:  Negative for back pain, joint swelling and myalgias.   Skin:  Negative for color change.   Neurological:  Negative for headaches.   Hematological:  Negative for adenopathy.   Psychiatric/Behavioral:  Negative for decreased concentration. The patient is not nervous/anxious.        Objective   /72   Pulse 80   Temp 36.6 °C (97.8 °F)   Ht 1.727 m (5' 8\")   Wt 87.6 kg (193 lb 3.2 oz)   LMP 2023 (Exact Date)   SpO2 98%   BMI 29.38 kg/m²     Physical Exam  Constitutional:       General: She is not in acute distress.     Appearance: She is obese. She is not ill-appearing.   HENT:      Head: Normocephalic and atraumatic.      Right Ear: Tympanic membrane, ear canal and external ear normal.      Left Ear: Tympanic membrane, ear canal and external ear normal.      Nose: Nose normal.      Mouth/Throat:      " Mouth: Mucous membranes are moist.      Pharynx: Oropharynx is clear.   Eyes:      Conjunctiva/sclera: Conjunctivae normal.      Pupils: Pupils are equal, round, and reactive to light.   Cardiovascular:      Rate and Rhythm: Normal rate and regular rhythm.      Pulses: Normal pulses.      Heart sounds: Normal heart sounds.   Pulmonary:      Effort: Pulmonary effort is normal. No respiratory distress.      Breath sounds: Normal breath sounds.   Abdominal:      General: Bowel sounds are normal.      Palpations: Abdomen is soft.      Tenderness: There is no abdominal tenderness.   Musculoskeletal:         General: Normal range of motion.   Skin:     General: Skin is warm and dry.   Neurological:      General: No focal deficit present.      Mental Status: She is alert and oriented to person, place, and time.   Psychiatric:         Mood and Affect: Mood normal.         Behavior: Behavior normal.         Thought Content: Thought content normal.         Judgment: Judgment normal.         Assessment/Plan   Problem List Items Addressed This Visit       Major depressive disorder (Chronic)    Relevant Medications    sertraline (Zoloft) 100 mg tablet    Fatigue    Relevant Orders    TSH with reflex to Free T4 if abnormal    CBC and Auto Differential    Comprehensive Metabolic Panel    Vitamin D insufficiency    Relevant Orders    Vitamin D 25-Hydroxy,Total (for eval of Vitamin D levels)     Other Visit Diagnoses       Physical exam, annual    -  Primary    Lipid screening        Relevant Orders    Lipid Panel    Unable to lose weight        Relevant Medications    phentermine (Adipex-P) 37.5 mg tablet    Class 1 obesity due to excess calories without serious comorbidity in adult, unspecified BMI        Relevant Medications    phentermine (Adipex-P) 37.5 mg tablet          Patient Instructions   Patient to start adipex and continue zoloft as ordered. Have fasting labs drawn, and we will call with results when available.  Follow-up in 1 month, or sooner if needed. Call the office if any problems or concerns in the meantime.

## 2024-01-09 ENCOUNTER — PHARMACY VISIT (OUTPATIENT)
Dept: PHARMACY | Facility: CLINIC | Age: 39
End: 2024-01-09
Payer: COMMERCIAL

## 2024-01-09 PROCEDURE — RXMED WILLOW AMBULATORY MEDICATION CHARGE

## 2024-01-11 ENCOUNTER — LAB (OUTPATIENT)
Dept: LAB | Facility: LAB | Age: 39
End: 2024-01-11
Payer: COMMERCIAL

## 2024-01-11 DIAGNOSIS — E55.9 VITAMIN D INSUFFICIENCY: ICD-10-CM

## 2024-01-11 DIAGNOSIS — R53.83 OTHER FATIGUE: ICD-10-CM

## 2024-01-11 DIAGNOSIS — Z13.220 LIPID SCREENING: ICD-10-CM

## 2024-01-11 LAB
25(OH)D3 SERPL-MCNC: 43 NG/ML (ref 30–100)
ALBUMIN SERPL BCP-MCNC: 3.9 G/DL (ref 3.4–5)
ALP SERPL-CCNC: 61 U/L (ref 33–110)
ALT SERPL W P-5'-P-CCNC: 20 U/L (ref 7–45)
ANION GAP SERPL CALC-SCNC: 12 MMOL/L (ref 10–20)
AST SERPL W P-5'-P-CCNC: 17 U/L (ref 9–39)
BASOPHILS # BLD AUTO: 0.04 X10*3/UL (ref 0–0.1)
BASOPHILS NFR BLD AUTO: 0.6 %
BILIRUB SERPL-MCNC: 0.3 MG/DL (ref 0–1.2)
BUN SERPL-MCNC: 17 MG/DL (ref 6–23)
CALCIUM SERPL-MCNC: 8.9 MG/DL (ref 8.6–10.3)
CHLORIDE SERPL-SCNC: 106 MMOL/L (ref 98–107)
CHOLEST SERPL-MCNC: 215 MG/DL (ref 0–199)
CHOLESTEROL/HDL RATIO: 4.6
CO2 SERPL-SCNC: 24 MMOL/L (ref 21–32)
CREAT SERPL-MCNC: 0.63 MG/DL (ref 0.5–1.05)
EGFRCR SERPLBLD CKD-EPI 2021: >90 ML/MIN/1.73M*2
EOSINOPHIL # BLD AUTO: 0.18 X10*3/UL (ref 0–0.7)
EOSINOPHIL NFR BLD AUTO: 2.6 %
ERYTHROCYTE [DISTWIDTH] IN BLOOD BY AUTOMATED COUNT: 12.7 % (ref 11.5–14.5)
GLUCOSE SERPL-MCNC: 92 MG/DL (ref 74–99)
HCT VFR BLD AUTO: 37.8 % (ref 36–46)
HDLC SERPL-MCNC: 46.8 MG/DL
HGB BLD-MCNC: 12.9 G/DL (ref 12–16)
IMM GRANULOCYTES # BLD AUTO: 0.02 X10*3/UL (ref 0–0.7)
IMM GRANULOCYTES NFR BLD AUTO: 0.3 % (ref 0–0.9)
LDLC SERPL CALC-MCNC: 146 MG/DL
LYMPHOCYTES # BLD AUTO: 2.29 X10*3/UL (ref 1.2–4.8)
LYMPHOCYTES NFR BLD AUTO: 32.9 %
MCH RBC QN AUTO: 31.9 PG (ref 26–34)
MCHC RBC AUTO-ENTMCNC: 34.1 G/DL (ref 32–36)
MCV RBC AUTO: 93 FL (ref 80–100)
MONOCYTES # BLD AUTO: 0.45 X10*3/UL (ref 0.1–1)
MONOCYTES NFR BLD AUTO: 6.5 %
NEUTROPHILS # BLD AUTO: 3.98 X10*3/UL (ref 1.2–7.7)
NEUTROPHILS NFR BLD AUTO: 57.1 %
NON HDL CHOLESTEROL: 168 MG/DL (ref 0–149)
NRBC BLD-RTO: 0 /100 WBCS (ref 0–0)
PLATELET # BLD AUTO: 313 X10*3/UL (ref 150–450)
POTASSIUM SERPL-SCNC: 4.3 MMOL/L (ref 3.5–5.3)
PROT SERPL-MCNC: 7.4 G/DL (ref 6.4–8.2)
RBC # BLD AUTO: 4.05 X10*6/UL (ref 4–5.2)
SODIUM SERPL-SCNC: 138 MMOL/L (ref 136–145)
TRIGL SERPL-MCNC: 112 MG/DL (ref 0–149)
TSH SERPL-ACNC: 1.35 MIU/L (ref 0.44–3.98)
VLDL: 22 MG/DL (ref 0–40)
WBC # BLD AUTO: 7 X10*3/UL (ref 4.4–11.3)

## 2024-01-11 PROCEDURE — 85025 COMPLETE CBC W/AUTO DIFF WBC: CPT

## 2024-01-11 PROCEDURE — 36415 COLL VENOUS BLD VENIPUNCTURE: CPT

## 2024-01-11 PROCEDURE — 80053 COMPREHEN METABOLIC PANEL: CPT

## 2024-01-11 PROCEDURE — 84443 ASSAY THYROID STIM HORMONE: CPT

## 2024-01-11 PROCEDURE — 82306 VITAMIN D 25 HYDROXY: CPT

## 2024-01-11 PROCEDURE — 80061 LIPID PANEL: CPT

## 2024-01-22 ENCOUNTER — APPOINTMENT (OUTPATIENT)
Dept: PRIMARY CARE | Facility: CLINIC | Age: 39
End: 2024-01-22
Payer: COMMERCIAL

## 2024-01-25 ENCOUNTER — HOSPITAL ENCOUNTER (OUTPATIENT)
Dept: CARDIOLOGY | Facility: HOSPITAL | Age: 39
Discharge: HOME | End: 2024-01-25
Payer: COMMERCIAL

## 2024-01-25 LAB
ATRIAL RATE: 65 BPM
P AXIS: 72 DEGREES
P OFFSET: 194 MS
P ONSET: 139 MS
PR INTERVAL: 158 MS
Q ONSET: 218 MS
QRS COUNT: 11 BEATS
QRS DURATION: 82 MS
QT INTERVAL: 418 MS
QTC CALCULATION(BAZETT): 434 MS
QTC FREDERICIA: 429 MS
R AXIS: 72 DEGREES
T AXIS: 58 DEGREES
T OFFSET: 427 MS
VENTRICULAR RATE: 65 BPM

## 2024-01-25 PROCEDURE — 93005 ELECTROCARDIOGRAM TRACING: CPT

## 2024-02-05 ENCOUNTER — OFFICE VISIT (OUTPATIENT)
Dept: PRIMARY CARE | Facility: CLINIC | Age: 39
End: 2024-02-05
Payer: COMMERCIAL

## 2024-02-05 ENCOUNTER — PHARMACY VISIT (OUTPATIENT)
Dept: PHARMACY | Facility: CLINIC | Age: 39
End: 2024-02-05
Payer: COMMERCIAL

## 2024-02-05 VITALS
BODY MASS INDEX: 27.92 KG/M2 | SYSTOLIC BLOOD PRESSURE: 116 MMHG | HEART RATE: 86 BPM | WEIGHT: 184.2 LBS | TEMPERATURE: 97.8 F | DIASTOLIC BLOOD PRESSURE: 64 MMHG | OXYGEN SATURATION: 99 % | HEIGHT: 68 IN | RESPIRATION RATE: 16 BRPM

## 2024-02-05 DIAGNOSIS — F32.9 MAJOR DEPRESSIVE DISORDER, REMISSION STATUS UNSPECIFIED, UNSPECIFIED WHETHER RECURRENT: Chronic | ICD-10-CM

## 2024-02-05 DIAGNOSIS — E66.09 CLASS 1 OBESITY DUE TO EXCESS CALORIES WITHOUT SERIOUS COMORBIDITY IN ADULT, UNSPECIFIED BMI: ICD-10-CM

## 2024-02-05 DIAGNOSIS — R63.8 UNABLE TO LOSE WEIGHT: Primary | ICD-10-CM

## 2024-02-05 PROBLEM — E66.9 OBESITY DUE TO ENERGY IMBALANCE: Status: ACTIVE | Noted: 2024-02-05

## 2024-02-05 PROCEDURE — RXMED WILLOW AMBULATORY MEDICATION CHARGE

## 2024-02-05 PROCEDURE — 1036F TOBACCO NON-USER: CPT | Performed by: NURSE PRACTITIONER

## 2024-02-05 PROCEDURE — 99213 OFFICE O/P EST LOW 20 MIN: CPT | Performed by: NURSE PRACTITIONER

## 2024-02-05 RX ORDER — SERTRALINE HYDROCHLORIDE 100 MG/1
100 TABLET, FILM COATED ORAL 2 TIMES DAILY
Qty: 180 TABLET | Refills: 3
Start: 2024-02-05 | End: 2024-04-15 | Stop reason: SDUPTHER

## 2024-02-05 RX ORDER — PHENTERMINE HYDROCHLORIDE 37.5 MG/1
37.5 TABLET ORAL
Qty: 30 TABLET | Refills: 0 | Status: SHIPPED | OUTPATIENT
Start: 2024-02-05 | End: 2024-03-04 | Stop reason: SDUPTHER

## 2024-02-05 ASSESSMENT — ENCOUNTER SYMPTOMS
RHINORRHEA: 0
BACK PAIN: 0
DECREASED CONCENTRATION: 0
CHILLS: 0
NAUSEA: 0
VOMITING: 0
JOINT SWELLING: 0
COLOR CHANGE: 0
SORE THROAT: 0
FEVER: 0
DIFFICULTY URINATING: 0
ADENOPATHY: 0
EYE DISCHARGE: 0
EYE ITCHING: 0
SINUS PRESSURE: 0
COUGH: 0
CONSTIPATION: 0
PALPITATIONS: 0
DIARRHEA: 0
HEADACHES: 0
NERVOUS/ANXIOUS: 0
FATIGUE: 0
DYSURIA: 0
SHORTNESS OF BREATH: 0
MYALGIAS: 0
EYE PAIN: 0
WHEEZING: 0

## 2024-02-05 ASSESSMENT — PAIN SCALES - GENERAL: PAINLEVEL: 0-NO PAIN

## 2024-02-05 NOTE — PROGRESS NOTES
"Subjective   Patient ID: Ninoska Crain is a 38 y.o. female who presents for medication check .    Patient states that she is doing well with the adipex, she has had some constipation but has increased her fiber and it has gotten better   Patient has lost 9 pounds since her last visit on 1.8.2024, states that she did also have the stomach bug to where she was vomiting          Review of Systems   Constitutional:  Negative for chills, fatigue and fever.   HENT:  Negative for congestion, ear pain, rhinorrhea, sinus pressure and sore throat.    Eyes:  Negative for pain, discharge and itching.   Respiratory:  Negative for cough, shortness of breath and wheezing.    Cardiovascular:  Negative for chest pain and palpitations.   Gastrointestinal:  Negative for constipation, diarrhea, nausea and vomiting.   Genitourinary:  Negative for difficulty urinating and dysuria.   Musculoskeletal:  Negative for back pain, joint swelling and myalgias.   Skin:  Negative for color change.   Neurological:  Negative for headaches.   Hematological:  Negative for adenopathy.   Psychiatric/Behavioral:  Negative for decreased concentration. The patient is not nervous/anxious.    All other systems reviewed and are negative.      Objective   /64   Pulse 86   Temp 36.6 °C (97.8 °F)   Resp 16   Ht 1.727 m (5' 8\")   Wt 83.6 kg (184 lb 3.2 oz)   LMP 03/17/2023 (Exact Date)   SpO2 99%   BMI 28.01 kg/m²     Physical Exam  Constitutional:       General: She is not in acute distress.     Appearance: She is not ill-appearing.   HENT:      Head: Normocephalic and atraumatic.      Mouth/Throat:      Mouth: Mucous membranes are moist.      Pharynx: Oropharynx is clear.   Eyes:      Conjunctiva/sclera: Conjunctivae normal.      Pupils: Pupils are equal, round, and reactive to light.   Cardiovascular:      Rate and Rhythm: Normal rate and regular rhythm.      Pulses: Normal pulses.      Heart sounds: Normal heart sounds.   Pulmonary:      Effort: " Pulmonary effort is normal. No respiratory distress.      Breath sounds: Normal breath sounds.   Abdominal:      General: Bowel sounds are normal.      Palpations: Abdomen is soft.      Tenderness: There is no abdominal tenderness.   Musculoskeletal:         General: Normal range of motion.   Skin:     General: Skin is warm and dry.   Neurological:      General: No focal deficit present.      Mental Status: She is alert and oriented to person, place, and time.   Psychiatric:         Mood and Affect: Mood normal.         Behavior: Behavior normal.         Thought Content: Thought content normal.         Judgment: Judgment normal.         Assessment/Plan   Problem List Items Addressed This Visit       Major depressive disorder (Chronic)    Relevant Medications    sertraline (Zoloft) 100 mg tablet     Other Visit Diagnoses       Unable to lose weight    -  Primary    Relevant Medications    phentermine (Adipex-P) 37.5 mg tablet    Class 1 obesity due to excess calories without serious comorbidity in adult, unspecified BMI        Relevant Medications    phentermine (Adipex-P) 37.5 mg tablet          Patient Instructions   Continue meds as ordered. Follow-up in 1 month, or sooner if needed. Call the office if any problems or concerns in the meantime.

## 2024-02-25 NOTE — PATIENT INSTRUCTIONS
Continue meds as ordered. Follow-up in 1 month, or sooner if needed. Call the office if any problems or concerns in the meantime.

## 2024-03-04 ENCOUNTER — PHARMACY VISIT (OUTPATIENT)
Dept: PHARMACY | Facility: CLINIC | Age: 39
End: 2024-03-04

## 2024-03-04 ENCOUNTER — OFFICE VISIT (OUTPATIENT)
Dept: PRIMARY CARE | Facility: CLINIC | Age: 39
End: 2024-03-04
Payer: COMMERCIAL

## 2024-03-04 VITALS
SYSTOLIC BLOOD PRESSURE: 124 MMHG | BODY MASS INDEX: 27.25 KG/M2 | TEMPERATURE: 97.7 F | RESPIRATION RATE: 16 BRPM | WEIGHT: 184 LBS | DIASTOLIC BLOOD PRESSURE: 72 MMHG | HEART RATE: 88 BPM | HEIGHT: 69 IN

## 2024-03-04 DIAGNOSIS — E66.09 CLASS 1 OBESITY DUE TO EXCESS CALORIES WITHOUT SERIOUS COMORBIDITY IN ADULT, UNSPECIFIED BMI: ICD-10-CM

## 2024-03-04 DIAGNOSIS — R63.8 UNABLE TO LOSE WEIGHT: ICD-10-CM

## 2024-03-04 PROCEDURE — RXMED WILLOW AMBULATORY MEDICATION CHARGE

## 2024-03-04 PROCEDURE — 99213 OFFICE O/P EST LOW 20 MIN: CPT | Performed by: NURSE PRACTITIONER

## 2024-03-04 PROCEDURE — 1036F TOBACCO NON-USER: CPT | Performed by: NURSE PRACTITIONER

## 2024-03-04 RX ORDER — PHENTERMINE HYDROCHLORIDE 37.5 MG/1
37.5 TABLET ORAL
Qty: 30 TABLET | Refills: 0 | Status: SHIPPED | OUTPATIENT
Start: 2024-03-04 | End: 2024-04-03

## 2024-03-04 NOTE — PROGRESS NOTES
Subjective   Patient ID: Ninoska Crain is a 38 y.o. female who presents for Follow-up (adipex).  Patient feels well overall, eating less, drinking lots of water, not exercising in the gym, but busy chasing young children at home. No side effects/problems with medications.    Review of Systems   Constitutional:  Negative for chills, fatigue and fever.   HENT:  Negative for congestion, ear pain, rhinorrhea, sinus pressure and sore throat.    Eyes:  Negative for pain, discharge and itching.   Respiratory:  Negative for cough, shortness of breath and wheezing.    Cardiovascular:  Negative for chest pain and palpitations.   Gastrointestinal:  Negative for constipation, diarrhea, nausea and vomiting.   Genitourinary:  Negative for difficulty urinating and dysuria.   Musculoskeletal:  Negative for back pain, joint swelling and myalgias.   Skin:  Negative for color change.   Neurological:  Negative for headaches.   Hematological:  Negative for adenopathy.   Psychiatric/Behavioral:  Negative for decreased concentration. The patient is not nervous/anxious.        Objective   Body mass index is 26.94 kg/m².  Vitals:    03/04/24 0913   BP: 124/72   Pulse: 88   Resp: 16   Temp: 36.5 °C (97.7 °F)      Physical Exam  Constitutional:       Appearance: Normal appearance.   Cardiovascular:      Rate and Rhythm: Normal rate and regular rhythm.      Pulses: Normal pulses.      Heart sounds: Normal heart sounds.   Pulmonary:      Effort: Pulmonary effort is normal.      Breath sounds: Normal breath sounds.   Abdominal:      General: Bowel sounds are normal.      Palpations: Abdomen is soft.   Musculoskeletal:         General: Normal range of motion.   Skin:     General: Skin is warm and dry.   Neurological:      Mental Status: She is alert.   Psychiatric:         Mood and Affect: Mood normal.         Behavior: Behavior normal.          Assessment/Plan   Obesity. I assessed April to be in an action stage with respect to weight  loss.    Assessment/Plan   Problem List Items Addressed This Visit    None  Visit Diagnoses       Unable to lose weight        Relevant Medications    phentermine (Adipex-P) 37.5 mg tablet    Class 1 obesity due to excess calories without serious comorbidity in adult, unspecified BMI        Relevant Medications    phentermine (Adipex-P) 37.5 mg tablet          Patient Instructions   Continue adipex as ordered, and follow-up in 1 month. Call the office if any problems or concerns in the meantime.

## 2024-03-06 DIAGNOSIS — N32.81 OVERACTIVE BLADDER: Primary | ICD-10-CM

## 2024-03-07 ENCOUNTER — PHARMACY VISIT (OUTPATIENT)
Dept: PHARMACY | Facility: CLINIC | Age: 39
End: 2024-03-07
Payer: COMMERCIAL

## 2024-03-07 PROCEDURE — RXMED WILLOW AMBULATORY MEDICATION CHARGE

## 2024-03-07 RX ORDER — SOLIFENACIN SUCCINATE 10 MG/1
10 TABLET, FILM COATED ORAL DAILY
Qty: 30 TABLET | Refills: 6 | Status: SHIPPED | OUTPATIENT
Start: 2024-03-07 | End: 2025-03-07

## 2024-03-21 ENCOUNTER — OFFICE VISIT (OUTPATIENT)
Dept: PRIMARY CARE | Facility: CLINIC | Age: 39
End: 2024-03-21
Payer: COMMERCIAL

## 2024-03-21 VITALS
BODY MASS INDEX: 27.53 KG/M2 | WEIGHT: 188 LBS | RESPIRATION RATE: 16 BRPM | SYSTOLIC BLOOD PRESSURE: 114 MMHG | OXYGEN SATURATION: 98 % | TEMPERATURE: 97.7 F | DIASTOLIC BLOOD PRESSURE: 72 MMHG | HEART RATE: 88 BPM

## 2024-03-21 DIAGNOSIS — H44.003 EYE INFECTION, BILATERAL: ICD-10-CM

## 2024-03-21 DIAGNOSIS — J01.00 ACUTE NON-RECURRENT MAXILLARY SINUSITIS: Primary | ICD-10-CM

## 2024-03-21 PROCEDURE — 1036F TOBACCO NON-USER: CPT | Performed by: NURSE PRACTITIONER

## 2024-03-21 PROCEDURE — 99213 OFFICE O/P EST LOW 20 MIN: CPT | Performed by: NURSE PRACTITIONER

## 2024-03-21 RX ORDER — AMOXICILLIN AND CLAVULANATE POTASSIUM 875; 125 MG/1; MG/1
875 TABLET, FILM COATED ORAL 2 TIMES DAILY
Qty: 20 TABLET | Refills: 0 | Status: SHIPPED | OUTPATIENT
Start: 2024-03-21 | End: 2024-03-31

## 2024-03-21 RX ORDER — POLYMYXIN B SULFATE AND TRIMETHOPRIM 1; 10000 MG/ML; [USP'U]/ML
1 SOLUTION OPHTHALMIC EVERY 4 HOURS
Qty: 10 ML | Refills: 0 | Status: SHIPPED | OUTPATIENT
Start: 2024-03-21 | End: 2024-03-28

## 2024-03-21 ASSESSMENT — ENCOUNTER SYMPTOMS
SHORTNESS OF BREATH: 0
CHILLS: 0
HOARSE VOICE: 0
CHEST TIGHTNESS: 0
VOMITING: 0
HEADACHES: 1
WEAKNESS: 0
LIGHT-HEADEDNESS: 0
SORE THROAT: 0
NAUSEA: 0
COUGH: 1
CONSTIPATION: 0
SORE THROAT: 1
SINUS PAIN: 1
SINUS PRESSURE: 1
DYSURIA: 0
ARTHRALGIAS: 0
MYALGIAS: 0
NUMBNESS: 0
WOUND: 0
TROUBLE SWALLOWING: 0
ABDOMINAL PAIN: 0
DIARRHEA: 0
CONFUSION: 0
EYE PAIN: 0
DECREASED CONCENTRATION: 0
FEVER: 0
VOMITING: 1
NECK PAIN: 0

## 2024-03-21 NOTE — PROGRESS NOTES
Subjective   Patient ID: Ninoska Crain is a 38 y.o. female who presents for Sore Throat.    Sore Throat   This is a new problem. The current episode started 1 to 4 weeks ago. The problem has been unchanged. There has been no fever. Associated symptoms include coughing, ear pain, headaches and vomiting. Pertinent negatives include no abdominal pain, diarrhea, ear discharge, hoarse voice, neck pain, shortness of breath or trouble swallowing. Associated symptoms comments: Eye redness and discharge. She has tried acetaminophen (Mucinex) for the symptoms. The treatment provided no relief.        Review of Systems   HENT:  Positive for ear pain and sore throat. Negative for ear discharge, hoarse voice and trouble swallowing.    Respiratory:  Positive for cough. Negative for shortness of breath.    Gastrointestinal:  Positive for vomiting. Negative for abdominal pain and diarrhea.   Musculoskeletal:  Negative for neck pain.   Neurological:  Positive for headaches.       Objective   /72   Pulse 88   Temp 36.5 °C (97.7 °F) (Temporal)   Resp 16   Wt 85.3 kg (188 lb)   LMP 03/17/2023 (Exact Date)   SpO2 98%   BMI 27.53 kg/m²     Physical Exam    Assessment/Plan   Problem List Items Addressed This Visit    None  Visit Diagnoses       Acute non-recurrent maxillary sinusitis    -  Primary    Relevant Medications    amoxicillin-pot clavulanate (Augmentin) 875-125 mg tablet    Eye infection, bilateral        Relevant Medications    polymyxin B sulf-trimethoprim (Polytrim) ophthalmic solution          Patient Instructions   Patient to start taking augmentin and polytrim eye drops as ordered, and tylenol and ibuprofen as needed. Call the office if no improvement by Monday. Follow-up in 1-2 weeks, or sooner if needed.

## 2024-03-21 NOTE — PROGRESS NOTES
Subjective   Patient ID: Ninoska Crain is a 38 y.o. female who presents for Sore Throat.    Patient presents with cold symptoms that she says have lasted for approximately 16 days.  She says she has copious amounts of thick yellow-green mucus.  She has taken Mucinex OTC which helps.         Review of Systems   Constitutional:  Negative for chills and fever.   HENT:  Positive for congestion, sinus pressure and sinus pain. Negative for sore throat.    Eyes:  Negative for pain.   Respiratory:  Negative for chest tightness and shortness of breath.    Cardiovascular:  Negative for chest pain.   Gastrointestinal:  Negative for abdominal pain, constipation, diarrhea, nausea and vomiting.   Genitourinary:  Negative for dysuria.   Musculoskeletal:  Negative for arthralgias and myalgias.   Skin:  Negative for rash and wound.   Neurological:  Positive for headaches. Negative for weakness, light-headedness and numbness.   Psychiatric/Behavioral:  Negative for confusion and decreased concentration.        Objective   /72   Pulse 88   Temp 36.5 °C (97.7 °F) (Temporal)   Resp 16   Wt 85.3 kg (188 lb)   LMP 03/17/2023 (Exact Date)   SpO2 98%   BMI 27.53 kg/m²     Physical Exam  Constitutional:       Appearance: Normal appearance.   HENT:      Head: Normocephalic and atraumatic.      Right Ear: Tympanic membrane, ear canal and external ear normal.      Left Ear: Tympanic membrane, ear canal and external ear normal.      Nose: Congestion present.      Mouth/Throat:      Mouth: Mucous membranes are moist.      Pharynx: Posterior oropharyngeal erythema present.   Eyes:      Conjunctiva/sclera: Conjunctivae normal.      Pupils: Pupils are equal, round, and reactive to light.   Cardiovascular:      Rate and Rhythm: Normal rate and regular rhythm.      Heart sounds: Normal heart sounds.   Pulmonary:      Effort: Pulmonary effort is normal.      Breath sounds: Normal breath sounds.   Abdominal:      General: Bowel sounds are  normal.      Palpations: Abdomen is soft.   Musculoskeletal:         General: Normal range of motion.      Cervical back: Normal range of motion.   Skin:     General: Skin is warm and dry.   Neurological:      General: No focal deficit present.      Mental Status: She is alert and oriented to person, place, and time.   Psychiatric:         Mood and Affect: Mood normal.         Behavior: Behavior normal.         Thought Content: Thought content normal.         Judgment: Judgment normal.         Assessment/Plan

## 2024-03-22 NOTE — PATIENT INSTRUCTIONS
Patient to start taking augmentin and polytrim eye drops as ordered, and tylenol and ibuprofen as needed. Call the office if no improvement by Monday. Follow-up in 1-2 weeks, or sooner if needed.

## 2024-03-25 ASSESSMENT — ENCOUNTER SYMPTOMS
RHINORRHEA: 0
HEADACHES: 0
EYE PAIN: 0
MYALGIAS: 0
ADENOPATHY: 0
DYSURIA: 0
NAUSEA: 0
PALPITATIONS: 0
FEVER: 0
SINUS PRESSURE: 0
NERVOUS/ANXIOUS: 0
CHILLS: 0
VOMITING: 0
COLOR CHANGE: 0
EYE DISCHARGE: 0
SORE THROAT: 0
EYE ITCHING: 0
JOINT SWELLING: 0
SHORTNESS OF BREATH: 0
DECREASED CONCENTRATION: 0
FATIGUE: 0
CONSTIPATION: 0
COUGH: 0
WHEEZING: 0
DIFFICULTY URINATING: 0
DIARRHEA: 0
BACK PAIN: 0

## 2024-03-26 NOTE — PATIENT INSTRUCTIONS
Continue adipex as ordered, and follow-up in 1 month. Call the office if any problems or concerns in the meantime.

## 2024-04-09 ENCOUNTER — OFFICE VISIT (OUTPATIENT)
Dept: OBSTETRICS AND GYNECOLOGY | Facility: CLINIC | Age: 39
End: 2024-04-09
Payer: COMMERCIAL

## 2024-04-09 VITALS
HEIGHT: 68 IN | SYSTOLIC BLOOD PRESSURE: 120 MMHG | DIASTOLIC BLOOD PRESSURE: 72 MMHG | WEIGHT: 189 LBS | HEART RATE: 94 BPM | BODY MASS INDEX: 28.64 KG/M2

## 2024-04-09 DIAGNOSIS — Z01.419 WELL WOMAN EXAM: Primary | ICD-10-CM

## 2024-04-09 DIAGNOSIS — N39.3 FEMALE STRESS INCONTINENCE: ICD-10-CM

## 2024-04-09 DIAGNOSIS — Z12.4 CERVICAL CANCER SCREENING: ICD-10-CM

## 2024-04-09 PROCEDURE — 88175 CYTOPATH C/V AUTO FLUID REDO: CPT | Mod: TC,GCY | Performed by: NURSE PRACTITIONER

## 2024-04-09 PROCEDURE — 99395 PREV VISIT EST AGE 18-39: CPT | Performed by: NURSE PRACTITIONER

## 2024-04-09 PROCEDURE — 87624 HPV HI-RISK TYP POOLED RSLT: CPT | Performed by: NURSE PRACTITIONER

## 2024-04-09 ASSESSMENT — PAIN SCALES - GENERAL: PAINLEVEL: 0-NO PAIN

## 2024-04-09 NOTE — PROGRESS NOTES
"  Subjective   Ninoska Crain is a 38 y.o. female who is here for a routine exam. She was her last 11/2023 seen by Dr. Velazquez to discuss a small internal hernia lateral sacral copepaxi mesh being closed.  She has since had surgery with Dr. Philippe Dillon general surgery for a laparoscopic small intestinal bowel obstruction and incarcerated small bowel hernia in 11/2023.     Current contraception: status post hysterectomy  History of abnormal Pap smear: no  Family history of uterine or ovarian cancer: no  Regular self breast exam:     History of abnormal mammogram: no  Family history of breast cancer: no  History of abnormal lipids: no  Menstrual History:  OB History    No obstetric history on file.      - 11/2024 surgery: Diagnostic laparoscopy reduction to incarcerated small bowel within pelvic internal hernia Laparoscopic repair of internal hernia defect.  - 3/17/2023 surgery: Robotic-assisted HERMELINDO, BS, SCPXY, MUS, perineorrhaphy, and cystoscopy.   - 6/16/2023 surgery: Midurethral sling lysis and excision of perineal skin tag.     Patient's last menstrual period was 03/17/2023 (exact date).     - Patient noted that she has not had any recurrence of prolapse since her surgery in November.  - She has continued taking Vesicare 10 mg which has slightly but still improved her incontinence but requires the use of a pad daily.      HPI  Review of Systems    Objective   /72   Pulse 94   Ht 1.727 m (5' 8\")   Wt 85.7 kg (189 lb)   LMP 03/17/2023 (Exact Date)   BMI 28.74 kg/m²     General:   alert and oriented, in no acute distress, appears stated age, and cooperative   Heart: regular rate and rhythm, S1, S2 normal, no murmur, click, rub or gallop   Lungs: clear to auscultation bilaterally   Abdomen: soft, non-tender, without masses or organomegaly   Vulva: normal   Vagina: No return of prolapse, no mesh concerns, no pelvic floor muscle tone and no ablility to Kegel, no pain.   Cervix: no bleeding following Pap "   Uterus: Absent   Adnexa:       Assessment/Plan   38 y.o. being assessed for pelvic floor dysfunction and routine care. Co morbidities: Vasovagal syncope, obesity, major depressive disorder, anxiety disorder, and migraine without aura.    #1 Cervical cancer screening  #2 Pelvic floor dysfunction    Plan    Cervical cancer screening  - Pap obtained    Pelvic floor dysfunction  - Patient has had no return of prolapse but continues to have some urinary incontinence.  On examination she has no pelvic floor muscle tone and was not able to do a Kegel.  - Pelvic floor physical therapy  - Continue Vesicare for now but she maybe able to discontinue Vesicare are starting pelvic floor physical therapy.      Scribe [place]station:  Fernando AVILEZ, am scribing for virtually, and in the presence of DELMER Puente on  04/22/24 at 12:38 PM   I, DELMER Puente, personally performed the services described in this documentation which was scribed virtually and I confirm that it is both accurate and complete.       DELMER Puente

## 2024-04-15 ENCOUNTER — PHARMACY VISIT (OUTPATIENT)
Dept: PHARMACY | Facility: CLINIC | Age: 39
End: 2024-04-15
Payer: COMMERCIAL

## 2024-04-15 DIAGNOSIS — F32.9 MAJOR DEPRESSIVE DISORDER, REMISSION STATUS UNSPECIFIED, UNSPECIFIED WHETHER RECURRENT: Chronic | ICD-10-CM

## 2024-04-15 PROCEDURE — RXMED WILLOW AMBULATORY MEDICATION CHARGE

## 2024-04-15 RX ORDER — SERTRALINE HYDROCHLORIDE 100 MG/1
200 TABLET, FILM COATED ORAL DAILY
Qty: 180 TABLET | Refills: 3 | Status: SHIPPED | OUTPATIENT
Start: 2024-04-15 | End: 2025-04-10

## 2024-04-15 RX ORDER — SERTRALINE HYDROCHLORIDE 100 MG/1
100 TABLET, FILM COATED ORAL 2 TIMES DAILY
Qty: 180 TABLET | Refills: 3 | Status: SHIPPED | OUTPATIENT
Start: 2024-04-15 | End: 2024-04-15 | Stop reason: SDUPTHER

## 2024-05-09 ENCOUNTER — PHARMACY VISIT (OUTPATIENT)
Dept: PHARMACY | Facility: CLINIC | Age: 39
End: 2024-05-09
Payer: COMMERCIAL

## 2024-05-09 PROCEDURE — RXMED WILLOW AMBULATORY MEDICATION CHARGE

## 2024-05-09 NOTE — PROGRESS NOTES
Physical Therapy    Physical Therapy Evaluation and Treatment      Patient Name: Ninoska Crain  MRN: [id number]  Today's Date: 5/10/2024  Time Calculation  Start Time: 1230  Stop Time: 1330  Time Calculation (min): 60 min    Assessment:    The patient presents to Physical Therapy with signs and symptoms consistent with the diagnosis of pelvic floor dyscoordination and weakness. Key impairments include: 1/5 MMT, tenderness at bulbospongiosus and vulvar opening near scar, mixed incontinence,nand difficulty with functional activities such as toileting, exercise .    Standardized testing and measures administered today, including ROM, strength, joint mobility testing, and observation which reveal that the patient has multiple impairments in body structure and functions, activity limitations, and participation restrictions. The patient has personal factors and comorbidities that may serve as barriers affecting plan of care. Today's findings indicate that the patient is of low complexity and would benefit from skilled PT to make measurable and meaningful changes in the above outcome measures and achieve improvements in the patient's functional status and individual goals. The patient verbalized understanding and is in agreement with all goals and plan of care.      Plan:   Pt will be seen 1-2x/week for 10 visits. Potential to achieve rehab goals is good. Plan of care was developed with input and agreement by the patient.    Treatment may include: Therapeutic Exercise (PROM, AA/AROM, Flexibility, Strengthening, Stabilization, HEP instruction). Therapeutic Activities (Transfers, Body Mechanics, Work Related Activities, Closed Chain, Agility and Power). Manual Techniques (Soft tissue Mobilization, Joint Mobilization/Distraction, Muscle Energy Techniques, Lymphatic Drainage, Dry Needling). Neuromuscular Reeducation (Postural Training, Balance/Proprioception, Relaxation Techniques). Biofeedback. Aquatic Exercise. Modalities:  Ultrasound, Cryotherapy, Vasopneumatic with or without Cryotherapy. Electrical Stimulation (TENS/IFC/ Premodulated for pain relief, NMES for Muscle reeducation). Gait Training. Orthotic Fit and Training. Strapping, Kinesiotaping.       Current Problem:   1. Muscle weakness  Follow Up In Physical Therapy      2. Female stress incontinence  Referral to Physical Therapy    Follow Up In Physical Therapy      3. Muscle tightness  Follow Up In Physical Therapy          Subjective    Pt is a 38  year old female with complaints of urinary incontinence. Pt has had uncomplicated vaginal deliveries x2 and has two sons. 2 years ago, 4 years ago. Prolapse from first birth. Went to pelvic floor PT. After second delivery pt underwent hysterectomy and sling, sling was tight in  it was loosened. Small bowel incarceration. Was able to repair, back to normal. Increased frequency. Taking medication for overactive bladder and is preparing to go off of the medication.   Leakage with some movements/ active movements, and one he way to the bathroom.   Patient Goal:  reduce the leakage, no more pad use, get off medication.   Full time nurse- navigator for spine surgeries.     Surgical History:   - 2024 surgery: Diagnostic laparoscopy reduction to incarcerated small bowel within pelvic internal hernia Laparoscopic repair of internal hernia defect.  - 3/17/2023 surgery: Robotic-assisted HERMELINDO, BS, SCPXY, MUS, perineorrhaphy, and cystoscopy.   - 2023 surgery: Midurethral sling lysis and excision of perineal skin tag.     Pt denies night pain, unrelenting pain, unexplained weight loss 10-20lb in the last 4 weeks, loss of appetite, unusual lumps or growth, unusual fatigue.  Pt denies numbness/ tingling. Pt denies bowel or bladder changes.    Hysterectomy? yes  Pregnancies #: 2  Episiotomies or  births? Tearing first birth two around the urethra, one 6 o'clock 1st or 2nd degree, had stitches.   Diagnosis of bladder uterus, or  bowel prolapse/descent? yes    Bladder-  Fluid consumed per day (oz.): 40oz  Pad/protection per day#: poise panty liner /per night#: no leaking at night  Number of night trips to restroom: 0  Day time frequency: 1-2 hours  Incontinence with coughing, sneezing, physical activity? Yes most of the time   Sudden uncontrolled urge sensation? Yes   Do you make it to the bathroom on time? Yes   Voiding completely? Yes, but sometimes moves or waits a minute and more comes out.   Post void dribble-  no   Bladder irritants (caffeine, alcohol, acidic food etc) -  one cup and one celsius per day, no alcohol, not  much acidic food maybe a couple times a week.     Bowel-  Frequency of movements: daily  [place] Stool Scale: average 2,3  fecal soiling? no  Straining? Usually not    Yellow/Red Flags-  Pain while toileting? no  Blood in urine or stool? no  Difficulty emptying completely? sometimes  Difficulty starting flow of urine? no  Sudden/extreme increased urination?  No    Safe at home? yes  Life experiences that could impact medical care? no  History of abuse or exploitation? no  Scars?  Yes at 6 oclock     Reproductive-  Painful sex? Or painful masturbation? no  Difficulty achieving orgasm? no    Precautions:  Precautions  Precautions Comment: No falls in last 6 mo    Pain:  Pain Assessment  Pain Assessment: 0-10  Pain Score: 0 - No pain      Objective     Lumbar ROM (degrees):  Flexion: 100% discomfort  Extension: 75% discomfort  Sidebend R/L: 100% kirby discomfort   Rotation R/L: 100%   HIP AROM (degrees):  Hip Flexion R/L: > 130 kirby   Hip Internal Rotation R/L:  50 kirby  Hip External Rotation R/L:  45 kirby  Hip Abduction R/L:  > 45 kirby    Flexibility (degrees):   Hamstring 90/90 position R/L: 10 kirby  Rectus (measured prone without spine motion) R/L: 2 in from heel to glute     Strength Testing:  Hip Abduction R/L: 4+/4+  Hip Extension R/L:   4/4    Pelvis:  Motion Testing-  Forward Bend: seated , standing WNL kirby  Stork Test:  WNL  Flare Test: WNL  Alignment-  ASIS: WNL  Medial Malleolus: WNL  PSIS:WNL  Ischial Tuberosities: WNL   Lumbar spine: symmetrical     Observation:  Spasm/Tenderness: none externally  Posture: slight PPT in seated          INTERNAL VAGINAL EXAMINATION:  PFM Strength: 0/5, 1/5 second attempt  Coordination: unable  Endurance: unable    Patient unable to bulge/eccentrically lengthen for movement of vaginal wall, anterior bladder prolapse visualized more prominently than rectal wall    Small twitch with involuntary contraction    INTERNAL PALPATION:   moderate pain and tightness at R bulbospongiosus     EXTERNAL VISUAL EXAM:  Unremarkable       Outcome Measures:  NIH-CPSI: 13    Treatments:    Therapeutic Exercise:  Kegel Program for beginners w printout    Edu:  Bladder irritants  Anti- inflammatory diet  Urge Suppression techniques  Proper hydration and mechanism for bladder urges  LATER: toileting practices, biofeedback, TA, multifidus, breathing    Sheet with exercise descriptions and images issued. Skilled intervention utilized in the appropriate selection & application of above exercises. Verbal and tactile cues provided for proper form and technique. Pt demonstrated appropriate form & verbalized understanding of optimal technique for above exercises.    Goals:  Patient will report pain levels of 0/10 at rest and 2/10 with activity.  Patient will report daytime voiding frequency 1x every 2-4 hours.    Patient will report decreased episodes of leaking by 80% or more overall.    Patient will evacuate bowels between 2x/day to every 2 days with type 3 or 4 on [place] Stool Scale.    Patient will demo normal resting tone in pelvic floor with a score of >/= 3 for Pelvic Muscle Strength Rating Scale.   Patient will demonstrate good lumbo-pelvic dissociation to increase postural awareness and alignment for toileting and strengthening.  Patient will improve hip abduction strength to >/= 4+/5.  Patient will demonstrate the  ability to contract-relax-and eccentrically lengthen the pelvic floor.    Patient will be knowledgeable of healthy bowel/bladder habits measured by teach back.    Patient will be knowledgeable of tools/strategies to calm the ANS measured by teach back.    Patient will demonstrate good diaphragm excursion/rib expansion.    Patient will be independent with a home exercise program.  NIH-CPSI score will improve by 6 points to demo MCID.

## 2024-05-10 ENCOUNTER — EVALUATION (OUTPATIENT)
Dept: PHYSICAL THERAPY | Facility: CLINIC | Age: 39
End: 2024-05-10
Payer: COMMERCIAL

## 2024-05-10 DIAGNOSIS — M62.81 MUSCLE WEAKNESS: Primary | ICD-10-CM

## 2024-05-10 DIAGNOSIS — M62.89 MUSCLE TIGHTNESS: ICD-10-CM

## 2024-05-10 DIAGNOSIS — N39.3 FEMALE STRESS INCONTINENCE: ICD-10-CM

## 2024-05-10 PROCEDURE — 97161 PT EVAL LOW COMPLEX 20 MIN: CPT | Mod: GP

## 2024-05-10 PROCEDURE — 97110 THERAPEUTIC EXERCISES: CPT | Mod: GP

## 2024-05-10 ASSESSMENT — PAIN - FUNCTIONAL ASSESSMENT: PAIN_FUNCTIONAL_ASSESSMENT: 0-10

## 2024-05-10 ASSESSMENT — PAIN SCALES - GENERAL: PAINLEVEL_OUTOF10: 0 - NO PAIN

## 2024-05-17 ENCOUNTER — TREATMENT (OUTPATIENT)
Dept: PHYSICAL THERAPY | Facility: CLINIC | Age: 39
End: 2024-05-17
Payer: COMMERCIAL

## 2024-05-17 DIAGNOSIS — M62.81 MUSCLE WEAKNESS: Primary | ICD-10-CM

## 2024-05-17 DIAGNOSIS — M62.89 MUSCLE TIGHTNESS: ICD-10-CM

## 2024-05-17 DIAGNOSIS — N39.8 VOIDING DYSFUNCTION: ICD-10-CM

## 2024-05-17 PROCEDURE — 97110 THERAPEUTIC EXERCISES: CPT | Mod: GP

## 2024-05-17 ASSESSMENT — PAIN - FUNCTIONAL ASSESSMENT: PAIN_FUNCTIONAL_ASSESSMENT: 0-10

## 2024-05-17 ASSESSMENT — PAIN SCALES - GENERAL: PAINLEVEL_OUTOF10: 0 - NO PAIN

## 2024-05-17 NOTE — PROGRESS NOTES
Physical Therapy    Physical Therapy Evaluation and Treatment      Patient Name: Ninoska Crain  MRN: [id number]  Today's Date: 5/20/2024        PT Therapeutic Procedures Time Entry  Therapeutic Exercise Time Entry: 40       Assessment:   Pt demos good resting tone as well as clear ability to contract and relax pelvic floor. These contractions appear to be isolated without accessory mm assistance.      Plan:  Continue per plan of care as tolerated. Patient to continue with HEP each day independently.      Current Problem:   1. Muscle weakness        2. Muscle tightness        3. Voiding dysfunction              Subjective    Pt reports same amount of use of pads, continued leakage, pt reports difficulty with increased fluid intake. Pt urination interval with hydration is every 30 min. Pt reports fatigue today.     Precautions:  Precautions  Precautions Comment: No falls in last 6 mo    Pain:  Pain Assessment  Pain Assessment: 0-10  Pain Score: 0 - No pain      Objective     Biofeedback:     10 on/10off  Work- Avg 8.9, Max 20.3  Rest- Avg 2.7, Max 14    2 on/4 off  Work- Avg 7.5, Max 15.8  Rest- Avg 3, Max 13.3    Treatments:    Therapeutic Exercise:  Biofeedback with pelvic floor contractions   Edu on toileting practices/posture  Breathing: chest, lateral, abdomen  Edu on sitting on towel for biofeedback, trial    Not this visit-   TA, multifidus

## 2024-05-20 ASSESSMENT — PAIN - FUNCTIONAL ASSESSMENT: PAIN_FUNCTIONAL_ASSESSMENT: 0-10

## 2024-05-20 ASSESSMENT — PAIN SCALES - GENERAL: PAINLEVEL_OUTOF10: 0 - NO PAIN

## 2024-05-24 ENCOUNTER — TREATMENT (OUTPATIENT)
Dept: PHYSICAL THERAPY | Facility: CLINIC | Age: 39
End: 2024-05-24
Payer: COMMERCIAL

## 2024-05-24 DIAGNOSIS — M62.89 MUSCLE TIGHTNESS: ICD-10-CM

## 2024-05-24 DIAGNOSIS — M62.81 MUSCLE WEAKNESS: Primary | ICD-10-CM

## 2024-05-24 DIAGNOSIS — N39.8 VOIDING DYSFUNCTION: ICD-10-CM

## 2024-05-24 PROCEDURE — 97110 THERAPEUTIC EXERCISES: CPT | Mod: GP

## 2024-05-24 ASSESSMENT — PAIN - FUNCTIONAL ASSESSMENT: PAIN_FUNCTIONAL_ASSESSMENT: 0-10

## 2024-05-24 ASSESSMENT — PAIN SCALES - GENERAL: PAINLEVEL_OUTOF10: 0 - NO PAIN

## 2024-06-07 ENCOUNTER — TREATMENT (OUTPATIENT)
Dept: PHYSICAL THERAPY | Facility: CLINIC | Age: 39
End: 2024-06-07
Payer: COMMERCIAL

## 2024-06-07 DIAGNOSIS — M62.89 MUSCLE TIGHTNESS: ICD-10-CM

## 2024-06-07 DIAGNOSIS — M62.81 MUSCLE WEAKNESS: Primary | ICD-10-CM

## 2024-06-07 DIAGNOSIS — N39.8 VOIDING DYSFUNCTION: ICD-10-CM

## 2024-06-07 PROCEDURE — 97110 THERAPEUTIC EXERCISES: CPT | Mod: GP

## 2024-06-07 ASSESSMENT — PAIN SCALES - GENERAL: PAINLEVEL_OUTOF10: 0 - NO PAIN

## 2024-06-07 ASSESSMENT — PAIN - FUNCTIONAL ASSESSMENT: PAIN_FUNCTIONAL_ASSESSMENT: 0-10

## 2024-06-07 NOTE — PROGRESS NOTES
Physical Therapy    Physical Therapy Treatment      Patient Name: Ninoska Crain  MRN: [id number]  Today's Date: 6/7/2024  Time Calculation  Start Time: 1330  Stop Time: 1430  Time Calculation (min): 60 min     PT Therapeutic Procedures Time Entry  Therapeutic Exercise Time Entry: 55       Assessment:   Pt demos substantial improvements in her pelvic floor strength and control this date. Pt will benefit from utilizing pelvic floor lift with exertion with exercise and to suppress urinary urges. Will formally re-assess next visit and discharge if pt feels comfortable.      Plan:  Continue per plan of care as tolerated. Patient to continue with HEP each day independently.      Current Problem:   1. Muscle weakness        2. Muscle tightness        3. Voiding dysfunction              Subjective    Pt reports adding electrolyte water is helping with water intake.   Pt reports some improvement but not where she wants to be.   Fatigue present but 4 year old has been waking her up.   Pt reports new breakouts since last visit.   Still using a pad/day. Small leak when rushing to the restroom even with increased fluid intake. Increased fluids at work. Reducing her rushing and small accidents. Slight improvement in 1-2 hour schedule.     Precautions:  Precautions  Precautions Comment: No falls in last 6 mo    Pain:  Pain Assessment  Pain Assessment: 0-10  Pain Score: 0 - No pain      Objective     Internal vaginal exam:   eccentric lengthening was palpable but slightly weak  Pelvic floor lift 3-4/5    Gait: improved lateral sway    Treatments:    Therapeutic Exercise:  Biofeedback with pelvic floor contractions NV  Breathing: chest, lateral, abdomen 30s ea   TA edu- contractions 5x10s  TA + BKFO x15  TA + march  TA + leg ext  TA + 90/90 half march  Multifidus supine 30s ea, sidelying 3x10s ea  Glute bridge x 10, + PF lift x 10  Glute bridge + march x5   Adductor x10, hold 30s w ball

## 2024-06-10 PROCEDURE — RXMED WILLOW AMBULATORY MEDICATION CHARGE

## 2024-06-11 ENCOUNTER — PHARMACY VISIT (OUTPATIENT)
Dept: PHARMACY | Facility: CLINIC | Age: 39
End: 2024-06-11
Payer: COMMERCIAL

## 2024-06-21 ENCOUNTER — TREATMENT (OUTPATIENT)
Dept: PHYSICAL THERAPY | Facility: CLINIC | Age: 39
End: 2024-06-21
Payer: COMMERCIAL

## 2024-06-21 DIAGNOSIS — N39.3 FEMALE STRESS INCONTINENCE: ICD-10-CM

## 2024-06-21 DIAGNOSIS — M62.89 MUSCLE TIGHTNESS: ICD-10-CM

## 2024-06-21 DIAGNOSIS — N39.8 VOIDING DYSFUNCTION: ICD-10-CM

## 2024-06-21 DIAGNOSIS — M62.81 MUSCLE WEAKNESS: Primary | ICD-10-CM

## 2024-06-21 PROCEDURE — 97110 THERAPEUTIC EXERCISES: CPT | Mod: GP

## 2024-06-21 ASSESSMENT — PAIN - FUNCTIONAL ASSESSMENT: PAIN_FUNCTIONAL_ASSESSMENT: 0-10

## 2024-06-21 ASSESSMENT — PAIN SCALES - GENERAL: PAINLEVEL_OUTOF10: 0 - NO PAIN

## 2024-06-21 NOTE — PROGRESS NOTES
Physical Therapy    Physical Therapy Evaluation and Treatment      Patient Name: Ninoska Crain  MRN: [id number]  Today's Date: 6/21/2024  Time Calculation  Start Time: 0953  Stop Time: 1040  Time Calculation (min): 47 min    Assessment:  Today patient practiced the knack with exercises to retrain pelvic floor to protect with unexpected abdominal pressures and muscular loads. Pt instructed to pair these skills with her favorite exercise routines independently.       Patient demonstrates improvements with lumbar ROM, hip extension strength, and frequency and quantity of leakages. Pt has not made a full recovery but is equipped with the tools to manage independently and demonstrates competence with skills and exercises. Pt encouraged to reach out if issues persist or new ones arise.     Plan:   Discharge to [facility]      Current Problem:   1. Muscle weakness        2. Muscle tightness        3. Voiding dysfunction        4. Female stress incontinence              Subjective    Pt reports no new complaints, still working on her increased water intake.     Leakage with deep squats, jumping. Not usually on the way to the bathroom, but has issues when she knows that she does not have bathroom access.     Bladder-  Fluid consumed per day (oz.): 40oz  Pad/protection per day#: poise panty liner /per night#: no leaking at night  Day time frequency: 2 hours  Sudden uncontrolled urge sensation? Sometimes   Voiding completely? Yes, but sometimes moves or waits a minute and more comes out.   Difficulty emptying completely? No (improved)      Precautions:  Precautions  Precautions Comment: No falls in last 6 mo    Pain:  Pain Assessment  Pain Assessment: 0-10  0-10 (Numeric) Pain Score: 0 - No pain      Objective     Lumbar ROM (degrees):  Flexion: 100% discomfort  Extension: 100% discomfort  Sidebend R/L: 100% kirby discomfort   Rotation R/L: 100%   HIP AROM (degrees):  Hip Flexion R/L: > 130 kirby   Hip Internal Rotation R/L:  50 kirby  Hip  "External Rotation R/L:  45 kirby  Hip Abduction R/L:  > 45 kirby    Flexibility (degrees):   Hamstring 90/90 position R/L: 10 kirby  Rectus (measured prone without spine motion) R/L: 1-2 in from heel to glute     Strength Testing:  Hip Abduction R/L: 4+/4+  Hip Extension R/L:   4+/4+    Pelvis:  Motion Testing-  Forward Bend: seated , standing WNL kirby  Stork Test: WNL  Flare Test: WNL    Observation:  Spasm/Tenderness: none externally  Posture: improved PPT in seated    Outcome Measures:  NIH-CPSI: 10  Subjective improvement in leakage: 50%    Treatments:    Therapeutic Exercise:  Re-assessment  Pelvic floor lift with:  - bridge  -adductor  -step up 6\"  -runners lunge  -sumo squat     Goals:  Patient will report pain levels of 0/10 at rest and 2/10 with activity. MET   Patient will report daytime voiding frequency 1x every 2-4 hours.  MET  Patient will report decreased episodes of leaking by 80% or more overall.  NOT MET 50%  Patient will evacuate bowels between 2x/day to every 2 days with type 3 or 4 on [place] Stool Scale.  MET  Patient will demo normal resting tone in pelvic floor with a score of >/= 3 for Pelvic Muscle Strength Rating Scale. MET  Patient will demonstrate good lumbo-pelvic dissociation to increase postural awareness and alignment for toileting and strengthening. MET  Patient will improve hip abduction/extension strength to >/= 4+/5. MET  Patient will demonstrate the ability to contract-relax-and eccentrically lengthen the pelvic floor.  MET  Patient will be knowledgeable of healthy bowel/bladder habits measured by teach back.  MET  Patient will be knowledgeable of tools/strategies to calm the ANS measured by teach back.  MET  Patient will demonstrate good diaphragm excursion/rib expansion.   MET  Patient will be independent with a home exercise program. MET  NIH-CPSI score will improve by 6 points to demo MCID. NOT MET    "

## 2024-07-08 ENCOUNTER — PHARMACY VISIT (OUTPATIENT)
Dept: PHARMACY | Facility: CLINIC | Age: 39
End: 2024-07-08
Payer: COMMERCIAL

## 2024-07-08 PROCEDURE — RXMED WILLOW AMBULATORY MEDICATION CHARGE

## 2024-07-16 ENCOUNTER — APPOINTMENT (OUTPATIENT)
Dept: OBSTETRICS AND GYNECOLOGY | Facility: CLINIC | Age: 39
End: 2024-07-16
Payer: COMMERCIAL

## 2024-07-23 ENCOUNTER — OFFICE VISIT (OUTPATIENT)
Dept: OBSTETRICS AND GYNECOLOGY | Facility: CLINIC | Age: 39
End: 2024-07-23
Payer: COMMERCIAL

## 2024-07-23 VITALS
BODY MASS INDEX: 29.7 KG/M2 | HEART RATE: 83 BPM | SYSTOLIC BLOOD PRESSURE: 126 MMHG | HEIGHT: 68 IN | WEIGHT: 196 LBS | DIASTOLIC BLOOD PRESSURE: 76 MMHG

## 2024-07-23 DIAGNOSIS — N32.81 OVERACTIVE BLADDER: ICD-10-CM

## 2024-07-23 DIAGNOSIS — N39.3 FEMALE STRESS INCONTINENCE: Primary | ICD-10-CM

## 2024-07-23 LAB
POC APPEARANCE, URINE: CLEAR
POC BILIRUBIN, URINE: NEGATIVE
POC BLOOD, URINE: NEGATIVE
POC COLOR, URINE: YELLOW
POC GLUCOSE, URINE: NEGATIVE MG/DL
POC KETONES, URINE: NEGATIVE MG/DL
POC LEUKOCYTES, URINE: NEGATIVE
POC NITRITE,URINE: NEGATIVE
POC PH, URINE: 8.5 PH
POC PROTEIN, URINE: NEGATIVE MG/DL
POC SPECIFIC GRAVITY, URINE: 1.01
POC UROBILINOGEN, URINE: 0.2 EU/DL

## 2024-07-23 PROCEDURE — 99212 OFFICE O/P EST SF 10 MIN: CPT | Performed by: NURSE PRACTITIONER

## 2024-07-23 PROCEDURE — 3008F BODY MASS INDEX DOCD: CPT | Performed by: NURSE PRACTITIONER

## 2024-07-23 PROCEDURE — 81003 URINALYSIS AUTO W/O SCOPE: CPT | Performed by: NURSE PRACTITIONER

## 2024-07-23 ASSESSMENT — ENCOUNTER SYMPTOMS
GASTROINTESTINAL NEGATIVE: 1
CONSTITUTIONAL NEGATIVE: 1
ENDOCRINE NEGATIVE: 1
NEUROLOGICAL NEGATIVE: 1
CARDIOVASCULAR NEGATIVE: 1
RESPIRATORY NEGATIVE: 1
PSYCHIATRIC NEGATIVE: 1
EYES NEGATIVE: 1
MUSCULOSKELETAL NEGATIVE: 1

## 2024-07-23 ASSESSMENT — PAIN SCALES - GENERAL: PAINLEVEL: 0-NO PAIN

## 2024-07-23 NOTE — PROGRESS NOTES
Ninoska Crain had a discussion of her overactive bladder.     Laboratory:   Urine dipstick shows negative.        Review of Systems   Constitutional: Negative.    HENT: Negative.     Eyes: Negative.    Respiratory: Negative.     Cardiovascular: Negative.    Gastrointestinal: Negative.    Endocrine: Negative.    Genitourinary:         Bladder sx are manageable; no new issues.   Musculoskeletal: Negative.    Neurological: Negative.    Psychiatric/Behavioral: Negative.          HPI     Interval History:  - She has a hx of an incarcerated small bowel hernia in November 2023, which required several surgeries.   - The aforementioned surgeries included a MUS and a robotic supracervical hysterectomy.  - Complications arose with the mesh in her bowel, necessitating further surgery.  - This series of events led to issues with her pelvic floor and bladder.  - Vesicare was prescribed and PFPT was recommended.    Pelvic Symptoms:  - She reports that she attended PFPT and was advised to continue exercises at home.    Urinary Symptoms:  - She has been inconsistent with her Vesicare, noting that she forgot to take it one day and did not experience significant issues.  - She resumed the medication during a recent vacation due to frequent urination but has since stopped taking it regularly.  - She reports that her bladder sx are manageable without the medication.  - She acknowledges the importance of continuing her PFPT exercises and hs noticed improvement when she adheres to the regimen.  - She hasn't been drinking enough water.        Assesment and Plan    38-year-old female being assessed for OAB and  follow-up.    Diagnoses:  #1 OAB  #2 MUS     Plan:  OAB  - Continue PFPT exercises at home.  - Use Vesicare PRN, particularly during travel or long trips.  - Encouraged better hydration.    2.History of SARAH and prolapse  - Continue monitoring for any complications related to the MUS and mesh.  - Ensure regular Paps due to retained  cervix post-hysterectomy.    Follow-up with DELMER Campoverde in April 2025.    Scribe Attestation  By signing my name below, IPradeep Scribe, attest that this documentation has been prepared under the direction and in the presence of DELMER Puente on 07/23/2024 at 4:53 PM.    I, DELMER Puente, personally performed the services described in this documentation which was scribed virtually and I confirm that it is both accurate and complete.     Discussion  Counseling regarding anti-cholinergic medication: This patient has been prescribed or is currently on an anti-cholinergic medication for treatment of overactive bladder/urge urinary incontinence. The patient was screened for contraindications to use. We reviewed indication for use, potential common side effects, and instructions for use. We reviewed association of anti-cholinergic medication use with increased risk of developing dementia. Alternatives to anti-cholinergic medication and different anti-cholinergic medications were discussed, including theoretical decreased risk with certain anti-cholinergics, and offered as appropriate for the patient. The patient expressed her understanding of the counseling provided. The patient's expressed preference is:? Vesicare PRN. Continue current anti-cholinergic medication.? Trial of anti-cholinergic medication (tolterodine, oxybutynin, solifenacin, fesoterodine).? Trial of anti-cholinergic medication with less blood-brain barrier crossover (trospium, darifenacin).? Trial of mirabegron.? Discontinue all medication for OAB/UUI. Has been on OAB med in the past Yes how many1-2      EDLMER Puente

## 2024-07-30 ENCOUNTER — APPOINTMENT (OUTPATIENT)
Dept: PRIMARY CARE | Facility: CLINIC | Age: 39
End: 2024-07-30
Payer: COMMERCIAL

## 2024-07-30 VITALS
BODY MASS INDEX: 29.7 KG/M2 | HEART RATE: 78 BPM | HEIGHT: 68 IN | OXYGEN SATURATION: 97 % | SYSTOLIC BLOOD PRESSURE: 118 MMHG | RESPIRATION RATE: 18 BRPM | DIASTOLIC BLOOD PRESSURE: 72 MMHG | WEIGHT: 196 LBS

## 2024-07-30 DIAGNOSIS — R63.8 UNABLE TO LOSE WEIGHT: Primary | ICD-10-CM

## 2024-07-30 DIAGNOSIS — E66.09 CLASS 1 OBESITY DUE TO EXCESS CALORIES WITHOUT SERIOUS COMORBIDITY WITH BODY MASS INDEX (BMI) OF 30.0 TO 30.9 IN ADULT: ICD-10-CM

## 2024-07-30 PROCEDURE — 3008F BODY MASS INDEX DOCD: CPT | Performed by: NURSE PRACTITIONER

## 2024-07-30 PROCEDURE — 99213 OFFICE O/P EST LOW 20 MIN: CPT | Performed by: NURSE PRACTITIONER

## 2024-07-30 RX ORDER — NALTREXONE HYDROCHLORIDE AND BUPROPION HYDROCHLORIDE 8; 90 MG/1; MG/1
2 TABLET, EXTENDED RELEASE ORAL 2 TIMES DAILY
Qty: 120 TABLET | Refills: 2 | Status: SHIPPED | OUTPATIENT
Start: 2024-07-30 | End: 2024-10-28

## 2024-07-30 ASSESSMENT — ENCOUNTER SYMPTOMS
WEAKNESS: 0
WOUND: 0
SINUS PRESSURE: 0
CHILLS: 0
DIARRHEA: 0
MYALGIAS: 0
SHORTNESS OF BREATH: 0
FEVER: 0
DYSURIA: 0
DECREASED CONCENTRATION: 0
LIGHT-HEADEDNESS: 0
CONFUSION: 0
CONSTIPATION: 0
VOMITING: 0
EYE PAIN: 0
CHEST TIGHTNESS: 0
NUMBNESS: 0
ABDOMINAL PAIN: 0
SORE THROAT: 0
NAUSEA: 0
SINUS PAIN: 0
ARTHRALGIAS: 0
HEADACHES: 0

## 2024-07-30 NOTE — PROGRESS NOTES
"Subjective   Patient ID: Ninoska Crain is a 38 y.o. female.    Patient seen today to discuss weight loss medication options.  She was previously taking Adipex for three months.  She said that since then she has improved her diet and exercise and would like to try something else for weight loss. She liked that she was able to concentrate and complete tasks at home and at work easier while she was on the Adipex. She thinks she might have ADHD as well.         Review of Systems   Constitutional:  Negative for chills and fever.   HENT:  Negative for congestion, sinus pressure, sinus pain and sore throat.    Eyes:  Negative for pain.   Respiratory:  Negative for chest tightness and shortness of breath.    Cardiovascular:  Negative for chest pain.   Gastrointestinal:  Negative for abdominal pain, constipation, diarrhea, nausea and vomiting.   Genitourinary:  Negative for dysuria.   Musculoskeletal:  Negative for arthralgias and myalgias.   Skin:  Negative for rash and wound.   Neurological:  Negative for weakness, light-headedness, numbness and headaches.   Psychiatric/Behavioral:  Negative for confusion and decreased concentration.        Objective   /72   Pulse 78   Resp 18   Ht 1.727 m (5' 8\")   Wt 88.9 kg (196 lb)   LMP 03/17/2023 (Exact Date)   SpO2 97%   BMI 29.80 kg/m²     Physical Exam  Constitutional:       Appearance: Normal appearance. She is obese.   HENT:      Head: Normocephalic and atraumatic.   Cardiovascular:      Rate and Rhythm: Normal rate and regular rhythm.      Heart sounds: Normal heart sounds. No murmur heard.  Pulmonary:      Effort: Pulmonary effort is normal. No respiratory distress.      Breath sounds: Normal breath sounds. No wheezing.   Musculoskeletal:         General: Normal range of motion.      Cervical back: Normal range of motion.   Skin:     General: Skin is warm and dry.   Neurological:      General: No focal deficit present.      Mental Status: She is alert and oriented " to person, place, and time.   Psychiatric:         Mood and Affect: Mood normal.         Behavior: Behavior normal.         Thought Content: Thought content normal.         Judgment: Judgment normal.         Assessment/Plan   Problem List Items Addressed This Visit    None  Visit Diagnoses       Unable to lose weight    -  Primary    Relevant Medications    naltrexone-bupropion (Contrave) 8-90 mg ER tablet    Other Relevant Orders    Referral to Nutrition Services    Class 1 obesity due to excess calories without serious comorbidity with body mass index (BMI) of 30.0 to 30.9 in adult        Relevant Medications    naltrexone-bupropion (Contrave) 8-90 mg ER tablet    Other Relevant Orders    Referral to Nutrition Services          Patient Instructions   Referred to dietician. Encouraged to try Contrave from their mail away pharmacy for best price point. She will call the office if prescription is too expensive, or if she would like to try semaglutide instead. Follow-up in 3 months, or sooner if needed. Call the office if any problems or concerns in the meantime.

## 2024-07-30 NOTE — PROGRESS NOTES
"Subjective   Patient ID: Ninoska Crain is a 38 y.o. female who presents for Medication Problem (Weight management meds).    HPI     Review of Systems    Objective   /72   Pulse 78   Resp 18   Ht 1.727 m (5' 8\")   Wt 88.9 kg (196 lb)   LMP 03/17/2023 (Exact Date)   SpO2 97%   BMI 29.80 kg/m²     Physical Exam    Assessment/Plan          "

## 2024-08-03 NOTE — PATIENT INSTRUCTIONS
Referred to dietician. Encouraged to try Contrave from their mail away pharmacy for best price point. She will call the office if prescription is too expensive, or if she would like to try semaglutide instead. Follow-up in 3 months, or sooner if needed. Call the office if any problems or concerns in the meantime.

## 2024-09-12 ENCOUNTER — APPOINTMENT (OUTPATIENT)
Dept: PRIMARY CARE | Facility: CLINIC | Age: 39
End: 2024-09-12
Payer: COMMERCIAL

## 2024-09-16 ENCOUNTER — APPOINTMENT (OUTPATIENT)
Dept: NUTRITION | Facility: HOSPITAL | Age: 39
End: 2024-09-16
Payer: COMMERCIAL

## 2024-09-16 VITALS — BODY MASS INDEX: 28.57 KG/M2 | WEIGHT: 188.49 LBS | HEIGHT: 68 IN

## 2024-09-16 DIAGNOSIS — R63.8 UNABLE TO LOSE WEIGHT: Primary | ICD-10-CM

## 2024-09-16 DIAGNOSIS — E66.09 CLASS 1 OBESITY DUE TO EXCESS CALORIES WITHOUT SERIOUS COMORBIDITY WITH BODY MASS INDEX (BMI) OF 30.0 TO 30.9 IN ADULT: ICD-10-CM

## 2024-09-16 PROBLEM — E66.811 CLASS 1 OBESITY DUE TO EXCESS CALORIES WITHOUT SERIOUS COMORBIDITY WITH BODY MASS INDEX (BMI) OF 30.0 TO 30.9 IN ADULT: Status: ACTIVE | Noted: 2024-09-16

## 2024-09-16 PROCEDURE — 97802 MEDICAL NUTRITION INDIV IN: CPT | Mod: 95

## 2024-09-16 NOTE — PATIENT INSTRUCTIONS
Nutrition Education Topics Discussed:   Provided Keys for a Healthy Lifestyle Handout. Discussed the following:  Start a daily exercise routine. Adults should target 150 minutes of moderate intensity exercise each week. Start slow and work your way up to this amount. Provided examples of aerobic, resistance, and stretching/balance activities.  Plan balanced meals. The “Plate Method” is a tool used to plan balanced meals. Aim for the following:  One-third to half the plate (or the meal) should be a non-starchy vegetable. Non-starchy vegetables include broccoli, cauliflower, salad greens, carrots, cucumbers, asparagus, green beans, tomatoes, and many more.  One-third to a quarter of the plate should be a lean protein. Lean proteins include chicken, turkey, fish, lean beef, eggs, nuts, tofu.  One third to a quarter of the plate can be a complex starch or carbohydrate. Examples of complex starches / carbohydrates include starchy vegetables (potatoes, peas, corn, and butternut squash), grains (whole wheat bread, quinoa, and brown rice), legumes (lentils and beans), and fruit.  Olive oil should be the primary fat source used for cooking.  Use a 9-10 inch plate to help manage portions  Pre-plan meal ideas. Spending time planning upfront saves you from relying on convenience foods. It can also help you save money! Your plan does not need to be rigid, but you should have some idea of what meals you are going to make going into the week. Place this information on the refrigerator in plain sight. There are many products you can purchase to help keep you organized.   Stay hydrated with water or other lower calorie beverages. We often confuse our body's signal for thirst with being hungry. Make sure you are staying hydrated, preferably with water. The best indicator of hydration is your urine. It should be a pale yellow. Provided examples of sugar-free beverages and ways to flavor water.   Pay attention to your body's hunger and  "fullness cues. Introduced patient to using a scale of 1-10 to rate hunger / satiety. Reviewed signs of hunger that patient might or might not feel, and encouraged being attentive to these signals if they are present. Encouraged patient to eat when feeling a \"3-4\" on the scale instead of waiting for hunger to become more severe. Encouraged patient to aim to stop eating when feeling at a \"6\" (satisfied, but could eat a little more) or a \"7\" (full but not uncomfortable). Recommended eating slowly and checking in with body to determine when body is really full. Discussed that it takes the brain around 10-15 minutes after eating to feel full. Encouraged using this method in conjunction with balanced foods on the plate using the Plate Method.    Discussed importance of consistent meal pattern   Discussed how eating consistently and balanced meals help improve satiety, boot metabolism and helps to improve blood glucose levels   Encouraged pt to eat first meal of the day within 1-2hrs after waking up to help boost metabolism   Encouraged meals and snacks to be  throughout the day with no more than a 3-4hr gap in between to help boost metabolism    "

## 2024-09-16 NOTE — PROGRESS NOTES
Nutrition Initial Assessment:     Patient Ninoska Crain is a 38 y.o. female being seen via virtual visit  who was referred by DELMER Abrams on 7/30/24 for   1. Unable to lose weight  Referral to Nutrition Services      2. Class 1 obesity due to excess calories without serious comorbidity with body mass index (BMI) of 30.0 to 30.9 in adult  Referral to Nutrition Services          Nutrition Assessment    Patient Active Problem List   Diagnosis    Acute bacterial conjunctivitis of both eyes    SBO (small bowel obstruction) (Multi)    Internal hernia    Major depressive disorder    Overactive bladder    Anxiety disorder    Atrophy of muscle of multiple sites    Diseases of lips    Fatigue    Female stress incontinence    Major depressive disorder, recurrent episode, mild (CMS-HCC)    Migraine without aura and with status migrainosus, not intractable    Prolapse, uterovaginal    Vasovagal syncope    Vitamin D insufficiency    Voiding dysfunction    Obesity due to energy imbalance    Muscle weakness    Muscle tightness    Unable to lose weight    Class 1 obesity due to excess calories without serious comorbidity with body mass index (BMI) of 30.0 to 30.9 in adult     Nutrition History:  Food & Nutrition History: Has been trying to lose wt but having difficulty, especially since having kids. Has tried many diets in the past with little success such as watching calories, increasing exercise, weight watchers. Started Contrave medication since July but has not seen much result. Finds she is hungriest between breakfast and lunch or late at night.  Food Allergies: none  Food Intolerances: none  Vitamin/mineral intake: Multivitamin, D, C (fish oil)     Prescription medications:    GI Symptoms: constipation (since starting Contrave; has increased fiber intake to help); Occurring >2 weeks? yes (since going to max dose of Contrave ~ the last 4 weeks)  Oral Problems: denies; Dentition: own  Sleep Habits: 7+ hrs  "continuous    Diet Recall:  Meal 1: B: (ranges between 7:30am-9am)  may make eggs before you leave for work OR when gets to work will have oatmeal (plain) with nichole or hemp seeds and some dried cranberries  Meal 2: L: (12:30pm): sometimes food from home (chicken with a salad with goat cheese with Italian dressing) or may buy food from cafe at work (soup or an entree, tries to pick healthy options.).  Meal 3: D: (5:30-6pm) husbands cooks usually a protein (chicken sausage), zucchini noodles, and marinara sauce. Usually a protein and veggies.  Snacks: Sometimes a mid-morning snack (not often) may be yogurt; afternoon snack (2-3pm) protein bar or some mini pc chocolate. HS snack (9-9:30pm) may have big bowl NF plain Greek yogurt with chocolate PB granola on top with almond butter and blueberries  Food Variety: Present  Oral Nutrition Supplement Use:  (sometimes will drink a protein powder with almond milk or a protein bar -- does this most days of the week)  Fluid Intake: cup of coffee in AM with creamer (SF almond/coconut creamer); water; Celsius  Energy Intake: Good > 75 %    Food Preparation:  Cooking: Spouse/Significant Other ( does most of the cooking)  Grocery Shopping: Patient, Spouse/Significant Other  Dining Out: Rare to None    Physical Activity:   Goes to the gym 1-2 nights a week with her friend and will go for 45-60mins and will do elliptical, treadmill and sometimes legs machines.  Consistency: Yes    Patient self identified challenges to dietary/lifestyle changes: sometimes can be hard to break a habit; has a sweet tooth    Food Security/Insecurity: Food / Nutrition Program Participation:  (no issues)    Anthropometrics:  Height: 172.7 cm (5' 7.99\")   Weight: 85.5 kg (188 lb 7.9 oz) (stated wt from pt that was from 9/13/24)   BMI (Calculated): 28.67    IBW/kg (Dietitian Calculated): 63.6 kg Percent of IBW: 134 %     Adjusted Body Weight (kg): 69.1 kg    Weight History:   Daily " "Weight  09/16/24 : 85.5 kg (188 lb 7.9 oz)  07/30/24 : 88.9 kg (196 lb)  07/23/24 : 88.9 kg (196 lb)  04/09/24 : 85.7 kg (189 lb)  03/21/24 : 85.3 kg (188 lb)  03/04/24 : 83.5 kg (184 lb)  02/05/24 : 83.6 kg (184 lb 3.2 oz)  01/08/24 : 87.6 kg (193 lb 3.2 oz)  12/12/23 : 89.8 kg (198 lb)  11/26/23 : 90.1 kg (198 lb 10.2 oz)    Weight Change %:  Weight History / % Weight Change: Based on recent reported wt per pt, wt down ~3.8% in ~2 months  Significant Weight Loss: No    Nutrition Focused Physical Exam Findings:  Performed/Deferred: Deferred due to be being virtual visit      Nutrition Significant Labs:  CMP trend:    Recent Labs     01/11/24  0747 11/26/23  0605 11/25/23  0620 11/24/23  1308 03/06/23  1436 12/07/22  0742   GLUCOSE 92 91 128* 108*   < > 82    139 136 135*   < > 137   K 4.3 3.5 3.8 3.8   < > 4.0    106 103 103   < > 105   CO2 24 27 23 20*   < > 24   ANIONGAP 12 10 14 16   < > 12   BUN 17 12 9 11   < > 17   CREATININE 0.63 0.65 0.56 0.65   < > 0.71   EGFR >90 >90 >90 >90   < >  --    CALCIUM 8.9 8.1* 8.4* 9.7   < > 9.1   ALBUMIN 3.9  --   --  4.5  --  4.3   ALKPHOS 61  --   --  59  --  69   PROT 7.4  --   --  8.1  --  7.5   AST 17  --   --  22  --  18   BILITOT 0.3  --   --  0.4  --  0.5   ALT 20  --   --  24  --  23    < > = values in this interval not displayed.     Lipid Panel trend:    Recent Labs     01/11/24  0747 12/07/22  0742 01/07/21  0816 12/22/18  0904   CHOL 215* 167 189 154   HDL 46.8 38.6* 44.0 53.2   LDLCALC 146*  --   --   --    LDLF  --  116* 129* 89   VLDL 22 12 16 12   TRIG 112 62 80 60   Hgb A1c trend: No results for input(s): \"HGBA1C\" in the last 87485 hours.,     Vit D:   Lab Results   Component Value Date    VITD25 43 01/11/2024     Nutrition Specific Medications:  Current Outpatient Medications   Medication Instructions    ascorbic acid (VITAMIN C) 1,000 mg, oral, Daily    cholecalciferol (Vitamin D-3) 50 mcg (2,000 unit) capsule 1 capsule, oral, Daily    " multivitamin tablet 1 tablet, oral, Daily    naltrexone-bupropion (Contrave) 8-90 mg ER tablet 2 tablets, oral, 2 times daily    omega 3-dha-epa-fish oil (Fish OiL) 1,000 mg (120 mg-180 mg) capsule oral, 2 times daily    sertraline (ZOLOFT) 200 mg, oral, Daily    solifenacin (VESIcare) 10 mg tablet TAKE 1 TABLET BY MOUTH ONCE DAILY        Estimated Needs:   Total Energy Estimated Needs (kCal):  (7616-7611); Method for Estimating Needs: MSJ=1586 x 1.3 AF; subtract 500 for desired wt loss  Total Protein Estimated Needs (g): 80 g; Method for Estimating Needs: 1.15gm/kg AjBW  Estimated Fluid Needs  Total Fluid Estimated Needs (mL):  (1791-3504)  Method for Estimating Needs: 25-27 kcal/kg AjBW         Nutrition Diagnosis   Malnutrition Diagnosis  Patient has Malnutrition Diagnosis: No    Nutrition Diagnosis  Patient has Nutrition Diagnosis: Yes  Diagnosis Status (1): New  Nutrition Diagnosis 1: Food and nutrition related knowledge deficit  Related to (1): lack of previous diet education  As Evidenced by (1): per pt report; diet recall of un-balanced meals       Nutrition Interventions/Recommendations   Nutrition Prescription: General healthy diet    Nutrition Interventions:   Food and Nutrient Delivery: Meals & Snacks: Energy-modified diet, General Healthful Diet     Coordination of Care:       Nutrition Education:   Nutrition Education Content: Content related nutrition education  Goals: Balanced meals using plate method, timing of meals, adequate hydration, benefits of exercise    Nutrition Education Topics Discussed:   Provided Keys for a Healthy Lifestyle Handout. Discussed the following:  Start a daily exercise routine. Adults should target 150 minutes of moderate intensity exercise each week. Start slow and work your way up to this amount. Provided examples of aerobic, resistance, and stretching/balance activities.  Plan balanced meals. The “Plate Method” is a tool used to plan balanced meals. Aim for the  "following:  One-third to half the plate (or the meal) should be a non-starchy vegetable. Non-starchy vegetables include broccoli, cauliflower, salad greens, carrots, cucumbers, asparagus, green beans, tomatoes, and many more.  One-third to a quarter of the plate should be a lean protein. Lean proteins include chicken, turkey, fish, lean beef, eggs, nuts, tofu.  One third to a quarter of the plate can be a complex starch or carbohydrate. Examples of complex starches / carbohydrates include starchy vegetables (potatoes, peas, corn, and butternut squash), grains (whole wheat bread, quinoa, and brown rice), legumes (lentils and beans), and fruit.  Olive oil should be the primary fat source used for cooking.  Use a 9-10 inch plate to help manage portions  Pre-plan meal ideas. Spending time planning upfront saves you from relying on convenience foods. It can also help you save money! Your plan does not need to be rigid, but you should have some idea of what meals you are going to make going into the week. Place this information on the refrigerator in plain sight. There are many products you can purchase to help keep you organized.   Stay hydrated with water or other lower calorie beverages. We often confuse our body's signal for thirst with being hungry. Make sure you are staying hydrated, preferably with water. The best indicator of hydration is your urine. It should be a pale yellow. Provided examples of sugar-free beverages and ways to flavor water.   Pay attention to your body's hunger and fullness cues. Introduced patient to using a scale of 1-10 to rate hunger / satiety. Reviewed signs of hunger that patient might or might not feel, and encouraged being attentive to these signals if they are present. Encouraged patient to eat when feeling a \"3-4\" on the scale instead of waiting for hunger to become more severe. Encouraged patient to aim to stop eating when feeling at a \"6\" (satisfied, but could eat a little more) or " "a \"7\" (full but not uncomfortable). Recommended eating slowly and checking in with body to determine when body is really full. Discussed that it takes the brain around 10-15 minutes after eating to feel full. Encouraged using this method in conjunction with balanced foods on the plate using the Plate Method.      Discussed importance of consistent meal pattern   Discussed how eating consistently and balanced meals help improve satiety, boot metabolism and helps to improve blood glucose levels   Encouraged pt to eat first meal of the day within 1-2hrs after waking up to help boost metabolism   Encouraged meals and snacks to be  throughout the day with no more than a 3-4hr gap in between to help boost metabolism      Educational Handouts Provided: [facility] Balanced Breakfast, High Protein Meal Ideas, High Protein Snack Ideas, Healthy Snack Blueprint Handout , and Keys for a Healthy Lifestyle     Nutrition Counseling: Strategies: Nutrition counseling based on motivational interviewing strategy, Nutrition counseling based on goal setting strategy    Patient Goals: 1. Pt will add a balanced mid-morning snack that includes a healthy carb and lean protein  2. Pt will have a balanced meal for breakfast, lunch and dinner using the plate method    Readiness to Change : Good  Level of Understanding : Good  Anticipated Compliant : Good         Nutrition Monitoring and Evaluation   Food/Nutrient Related History Monitoring  Monitoring and Evaluation Plan: Meal/snack pattern  Meal/Snack Pattern: Estimated meal and snack pattern  Criteria: Consume 3 balanced meals per day using plate method & 1-2 balanced snacks a day     Body Composition/Growth/Weight History  Monitoring and Evaluation Plan: Weight  Weight: Measured weight  Criteria: Intentional weight loss of 0.5-2 lb per week, trending toward a clinically significant weight loss of 5-10% of current body weight.    Biochemical Data, Medical Tests and Procedures  Monitoring and " Evaluation Plan: Lipid profile  Criteria: CHOL <200;  HDL 40-60;  LDL <100;  TG <150 mg/dL          Follow Up: Pt declined nutrition follow up for now. Would like to try the recommendations discussed today and will call to schedule follow up if she needs.       Time Spent  Prep time on day of patient encounter: 5 minutes  Time spent directly with patient, family or caregiver: 50 minutes  Documentation Time: 5 minutes

## 2024-09-30 PROCEDURE — RXMED WILLOW AMBULATORY MEDICATION CHARGE

## 2024-10-01 ENCOUNTER — PHARMACY VISIT (OUTPATIENT)
Dept: PHARMACY | Facility: CLINIC | Age: 39
End: 2024-10-01
Payer: COMMERCIAL

## 2024-11-01 ENCOUNTER — OFFICE VISIT (OUTPATIENT)
Dept: PRIMARY CARE | Facility: CLINIC | Age: 39
End: 2024-11-01
Payer: COMMERCIAL

## 2024-11-01 ENCOUNTER — HOSPITAL ENCOUNTER (OUTPATIENT)
Dept: RADIOLOGY | Facility: CLINIC | Age: 39
Discharge: HOME | End: 2024-11-01
Payer: COMMERCIAL

## 2024-11-01 VITALS
BODY MASS INDEX: 28.44 KG/M2 | WEIGHT: 187 LBS | HEART RATE: 94 BPM | DIASTOLIC BLOOD PRESSURE: 70 MMHG | OXYGEN SATURATION: 96 % | SYSTOLIC BLOOD PRESSURE: 104 MMHG | TEMPERATURE: 97.7 F

## 2024-11-01 DIAGNOSIS — R05.1 ACUTE COUGH: Primary | ICD-10-CM

## 2024-11-01 DIAGNOSIS — R05.1 ACUTE COUGH: ICD-10-CM

## 2024-11-01 DIAGNOSIS — J02.9 SORE THROAT: ICD-10-CM

## 2024-11-01 DIAGNOSIS — R05.9 COUGH, UNSPECIFIED TYPE: ICD-10-CM

## 2024-11-01 LAB
POC RAPID INFLUENZA A: NEGATIVE
POC RAPID INFLUENZA B: NEGATIVE
POC RAPID STREP: NEGATIVE
POC SARS-COV-2 AG BINAX: NORMAL

## 2024-11-01 PROCEDURE — 1036F TOBACCO NON-USER: CPT | Performed by: NURSE PRACTITIONER

## 2024-11-01 PROCEDURE — 99213 OFFICE O/P EST LOW 20 MIN: CPT | Performed by: NURSE PRACTITIONER

## 2024-11-01 PROCEDURE — 87811 SARS-COV-2 COVID19 W/OPTIC: CPT | Performed by: NURSE PRACTITIONER

## 2024-11-01 PROCEDURE — 71046 X-RAY EXAM CHEST 2 VIEWS: CPT

## 2024-11-01 PROCEDURE — 87804 INFLUENZA ASSAY W/OPTIC: CPT | Performed by: NURSE PRACTITIONER

## 2024-11-01 PROCEDURE — 87880 STREP A ASSAY W/OPTIC: CPT | Performed by: NURSE PRACTITIONER

## 2024-11-01 ASSESSMENT — ENCOUNTER SYMPTOMS
VOMITING: 0
CONSTIPATION: 0
APPETITE CHANGE: 1
RHINORRHEA: 0
CHILLS: 1
SORE THROAT: 1
SLEEP DISTURBANCE: 0
SHORTNESS OF BREATH: 0
HEADACHES: 1
COUGH: 1
NAUSEA: 0
DIARRHEA: 0
ACTIVITY CHANGE: 0
WHEEZING: 1
FEVER: 1

## 2024-11-04 ENCOUNTER — TELEMEDICINE (OUTPATIENT)
Dept: PRIMARY CARE | Facility: CLINIC | Age: 39
End: 2024-11-04
Payer: COMMERCIAL

## 2024-11-04 DIAGNOSIS — E66.09 CLASS 1 OBESITY DUE TO EXCESS CALORIES WITHOUT SERIOUS COMORBIDITY WITH BODY MASS INDEX (BMI) OF 30.0 TO 30.9 IN ADULT: ICD-10-CM

## 2024-11-04 DIAGNOSIS — R63.8 UNABLE TO LOSE WEIGHT: ICD-10-CM

## 2024-11-04 DIAGNOSIS — E66.811 CLASS 1 OBESITY DUE TO EXCESS CALORIES WITHOUT SERIOUS COMORBIDITY WITH BODY MASS INDEX (BMI) OF 30.0 TO 30.9 IN ADULT: ICD-10-CM

## 2024-11-04 DIAGNOSIS — J18.9 COMMUNITY ACQUIRED PNEUMONIA, UNSPECIFIED LATERALITY: Primary | ICD-10-CM

## 2024-11-04 PROCEDURE — 1036F TOBACCO NON-USER: CPT | Performed by: NURSE PRACTITIONER

## 2024-11-04 PROCEDURE — 99213 OFFICE O/P EST LOW 20 MIN: CPT | Performed by: NURSE PRACTITIONER

## 2024-11-04 RX ORDER — PHENTERMINE AND TOPIRAMATE 7.5; 46 MG/1; MG/1
1 CAPSULE, EXTENDED RELEASE ORAL EVERY MORNING
Qty: 30 CAPSULE | Refills: 0 | Status: SHIPPED | OUTPATIENT
Start: 2024-11-04 | End: 2024-12-04

## 2024-11-04 RX ORDER — PHENTERMINE AND TOPIRAMATE 3.75; 23 MG/1; MG/1
1 CAPSULE, EXTENDED RELEASE ORAL EVERY MORNING
Qty: 14 CAPSULE | Refills: 0 | Status: SHIPPED | OUTPATIENT
Start: 2024-11-04 | End: 2024-11-18

## 2024-11-04 RX ORDER — AZITHROMYCIN 500 MG/1
500 TABLET, FILM COATED ORAL DAILY
Qty: 5 TABLET | Refills: 0 | Status: SHIPPED | OUTPATIENT
Start: 2024-11-04 | End: 2024-11-09

## 2024-11-04 RX ORDER — METHYLPREDNISOLONE 4 MG/1
TABLET ORAL
Qty: 21 TABLET | Refills: 0 | Status: SHIPPED | OUTPATIENT
Start: 2024-11-04 | End: 2024-11-11

## 2024-11-04 NOTE — PROGRESS NOTES
Subjective   Patient ID: Ninoska Crain is a 39 y.o. female who presents for Pneumonia.  Wednesday she started having body aches, Thursday night temp of 102, rotating tylenol and ibuprofen around the clock, fevers when the meds ran out. Urgent care sent her for swabs and xray, but xray is not back yet.    Also taking Contrave, which suppresses her appetite slightly, but she hasn't lost weight on. Did well with adipex in the past, would like to try Qsymia.     Pneumonia  She complains of chest tightness, cough, difficulty breathing, frequent throat clearing, hoarse voice, shortness of breath and wheezing. There is no hemoptysis. This is a new problem. The current episode started in the past 7 days. The problem occurs constantly. The problem has been unchanged. The cough is productive, harsh and hacking. Associated symptoms include appetite change, a fever, malaise/fatigue, myalgias, nasal congestion, postnasal drip, rhinorrhea and sneezing. Pertinent negatives include no chest pain, ear pain, headaches or sore throat. Her symptoms are aggravated by any activity, exercise and lying down. Her symptoms are alleviated by nothing. There are no known risk factors for lung disease.       Review of Systems   Constitutional:  Positive for appetite change, fatigue, fever and malaise/fatigue.   HENT:  Positive for congestion, hoarse voice, postnasal drip, rhinorrhea, sneezing and voice change. Negative for ear pain and sore throat.    Respiratory:  Positive for cough, shortness of breath and wheezing. Negative for hemoptysis.    Cardiovascular:  Negative for chest pain.   Gastrointestinal:  Negative for constipation and diarrhea.   Musculoskeletal:  Positive for myalgias.   Neurological:  Negative for headaches.   Psychiatric/Behavioral:  The patient is not nervous/anxious.        Objective     LMP 03/17/2023 (Exact Date)      Physical Exam  Constitutional:       General: She is not in acute distress.     Appearance: She is  ill-appearing.   HENT:      Nose: Congestion present.   Pulmonary:      Effort: Tachypnea present. No respiratory distress.   Neurological:      Mental Status: She is alert.         Assessment/Plan   Problem List Items Addressed This Visit       Unable to lose weight    Class 1 obesity due to excess calories without serious comorbidity with body mass index (BMI) of 30.0 to 30.9 in adult     Other Visit Diagnoses       Community acquired pneumonia, unspecified laterality    -  Primary            Patient Instructions   Patient to start taking azithromycin and medrol dose jericho as ordered, and tylenol and ibuprofen as needed. Stop Contrave, and start Qsymia. Call the office if no improvement in 1 week. Follow-up in 2 weeks as needed.     This visit was completed via telehealth due to the restrictions of the COVID-19 pandemic. All issues as below were discussed and addressed but no physical exam was performed. If it was felt that the patient should be evaluated in clinic then they were directed there. The patient verbally consented to visit.  Spent 10 minutes with pt face to face and more than 50% of this time was spent in counseling and coordination of care.

## 2024-11-11 ENCOUNTER — APPOINTMENT (OUTPATIENT)
Dept: PRIMARY CARE | Facility: CLINIC | Age: 39
End: 2024-11-11
Payer: COMMERCIAL

## 2024-11-11 DIAGNOSIS — R63.8 UNABLE TO LOSE WEIGHT: ICD-10-CM

## 2024-11-11 DIAGNOSIS — E66.811 CLASS 1 OBESITY DUE TO EXCESS CALORIES WITHOUT SERIOUS COMORBIDITY WITH BODY MASS INDEX (BMI) OF 30.0 TO 30.9 IN ADULT: ICD-10-CM

## 2024-11-11 DIAGNOSIS — E66.09 CLASS 1 OBESITY DUE TO EXCESS CALORIES WITHOUT SERIOUS COMORBIDITY WITH BODY MASS INDEX (BMI) OF 30.0 TO 30.9 IN ADULT: ICD-10-CM

## 2024-11-11 RX ORDER — PHENTERMINE AND TOPIRAMATE 3.75; 23 MG/1; MG/1
1 CAPSULE, EXTENDED RELEASE ORAL EVERY MORNING
Qty: 14 CAPSULE | Refills: 0 | Status: SHIPPED | OUTPATIENT
Start: 2024-11-11 | End: 2024-11-25

## 2024-11-11 RX ORDER — PHENTERMINE AND TOPIRAMATE 7.5; 46 MG/1; MG/1
1 CAPSULE, EXTENDED RELEASE ORAL EVERY MORNING
Qty: 30 CAPSULE | Refills: 0 | Status: SHIPPED | OUTPATIENT
Start: 2024-11-11 | End: 2024-12-11

## 2024-11-17 ASSESSMENT — ENCOUNTER SYMPTOMS
CONSTIPATION: 0
HEMOPTYSIS: 0
DIARRHEA: 0
NERVOUS/ANXIOUS: 0
MYALGIAS: 1
WHEEZING: 1
CHEST TIGHTNESS: 1
DIFFICULTY BREATHING: 1
COUGH: 1
VOICE CHANGE: 1
SHORTNESS OF BREATH: 1
FATIGUE: 1
FREQUENT THROAT CLEARING: 1
HEADACHES: 0
HOARSE VOICE: 1
APPETITE CHANGE: 1
FEVER: 1
SORE THROAT: 0
RHINORRHEA: 1

## 2024-11-17 NOTE — PATIENT INSTRUCTIONS
Patient to start taking azithromycin and medrol dose jericho as ordered, and tylenol and ibuprofen as needed. Stop Contrave, and start Qsymia. Call the office if no improvement in 1 week. Follow-up in 2 weeks as needed.

## 2024-12-26 DIAGNOSIS — E66.09 CLASS 1 OBESITY DUE TO EXCESS CALORIES WITHOUT SERIOUS COMORBIDITY WITH BODY MASS INDEX (BMI) OF 30.0 TO 30.9 IN ADULT: ICD-10-CM

## 2024-12-26 DIAGNOSIS — R63.8 UNABLE TO LOSE WEIGHT: ICD-10-CM

## 2024-12-26 DIAGNOSIS — E66.811 CLASS 1 OBESITY DUE TO EXCESS CALORIES WITHOUT SERIOUS COMORBIDITY WITH BODY MASS INDEX (BMI) OF 30.0 TO 30.9 IN ADULT: ICD-10-CM

## 2024-12-26 RX ORDER — PHENTERMINE AND TOPIRAMATE 7.5; 46 MG/1; MG/1
1 CAPSULE, EXTENDED RELEASE ORAL EVERY MORNING
Qty: 30 CAPSULE | Refills: 2 | Status: SHIPPED | OUTPATIENT
Start: 2024-12-26 | End: 2025-03-26

## 2024-12-30 PROCEDURE — RXMED WILLOW AMBULATORY MEDICATION CHARGE

## 2024-12-31 ENCOUNTER — PHARMACY VISIT (OUTPATIENT)
Dept: PHARMACY | Facility: CLINIC | Age: 39
End: 2024-12-31
Payer: COMMERCIAL

## 2025-02-10 ENCOUNTER — APPOINTMENT (OUTPATIENT)
Dept: PRIMARY CARE | Facility: CLINIC | Age: 40
End: 2025-02-10
Payer: COMMERCIAL

## 2025-02-10 VITALS
WEIGHT: 191 LBS | HEIGHT: 68 IN | DIASTOLIC BLOOD PRESSURE: 71 MMHG | SYSTOLIC BLOOD PRESSURE: 108 MMHG | OXYGEN SATURATION: 98 % | HEART RATE: 90 BPM | BODY MASS INDEX: 28.95 KG/M2 | RESPIRATION RATE: 16 BRPM | TEMPERATURE: 98.4 F

## 2025-02-10 DIAGNOSIS — F32.9 MAJOR DEPRESSIVE DISORDER, REMISSION STATUS UNSPECIFIED, UNSPECIFIED WHETHER RECURRENT: ICD-10-CM

## 2025-02-10 DIAGNOSIS — Z13.220 LIPID SCREENING: ICD-10-CM

## 2025-02-10 DIAGNOSIS — R63.8 UNABLE TO LOSE WEIGHT: ICD-10-CM

## 2025-02-10 DIAGNOSIS — E66.811 CLASS 1 OBESITY DUE TO EXCESS CALORIES WITHOUT SERIOUS COMORBIDITY WITH BODY MASS INDEX (BMI) OF 30.0 TO 30.9 IN ADULT: ICD-10-CM

## 2025-02-10 DIAGNOSIS — E55.9 VITAMIN D INSUFFICIENCY: Primary | ICD-10-CM

## 2025-02-10 DIAGNOSIS — E66.09 CLASS 1 OBESITY DUE TO EXCESS CALORIES WITHOUT SERIOUS COMORBIDITY WITH BODY MASS INDEX (BMI) OF 30.0 TO 30.9 IN ADULT: ICD-10-CM

## 2025-02-10 PROCEDURE — 3008F BODY MASS INDEX DOCD: CPT | Performed by: NURSE PRACTITIONER

## 2025-02-10 PROCEDURE — 99213 OFFICE O/P EST LOW 20 MIN: CPT | Performed by: NURSE PRACTITIONER

## 2025-02-10 RX ORDER — PHENTERMINE AND TOPIRAMATE 15; 92 MG/1; MG/1
1 CAPSULE, EXTENDED RELEASE ORAL DAILY
Qty: 30 CAPSULE | Refills: 2 | Status: SHIPPED | OUTPATIENT
Start: 2025-02-10 | End: 2025-03-12

## 2025-02-10 RX ORDER — PHENTERMINE AND TOPIRAMATE 11.25; 69 MG/1; MG/1
1 CAPSULE, EXTENDED RELEASE ORAL DAILY
Qty: 14 CAPSULE | Refills: 0 | Status: SHIPPED | OUTPATIENT
Start: 2025-02-10 | End: 2025-02-24

## 2025-02-10 ASSESSMENT — ENCOUNTER SYMPTOMS
HEADACHES: 0
CHILLS: 0
SHORTNESS OF BREATH: 0
FATIGUE: 0
DIARRHEA: 0
CONSTIPATION: 0
NERVOUS/ANXIOUS: 0
UNEXPECTED WEIGHT CHANGE: 1
FEVER: 0
SORE THROAT: 0
DYSURIA: 0
RHINORRHEA: 0

## 2025-02-10 ASSESSMENT — PATIENT HEALTH QUESTIONNAIRE - PHQ9
2. FEELING DOWN, DEPRESSED OR HOPELESS: NOT AT ALL
SUM OF ALL RESPONSES TO PHQ9 QUESTIONS 1 AND 2: 0
1. LITTLE INTEREST OR PLEASURE IN DOING THINGS: NOT AT ALL

## 2025-02-10 NOTE — PROGRESS NOTES
Subjective   Ninoska Crain is a 39 y.o. female who presents for Follow-up (3 month ).  Patient presents for her 3 month med check, trying to lose weight.    Qsymia is helping her not to be hungry, but she's not losing weight.   She is snacking at the end of the day because she's not eating well throughout the day.  She's eating healthy meals, but snacks unhealthily.       Review of Systems   Constitutional:  Positive for unexpected weight change. Negative for chills, fatigue and fever.   HENT:  Negative for rhinorrhea and sore throat.    Respiratory:  Negative for shortness of breath.    Cardiovascular:  Negative for chest pain.   Gastrointestinal:  Negative for constipation and diarrhea.   Genitourinary:  Negative for dysuria.   Neurological:  Negative for headaches.   Psychiatric/Behavioral:  The patient is not nervous/anxious.    All other systems reviewed and are negative.      Objective   Physical Exam  Constitutional:       General: She is not in acute distress.     Appearance: She is obese. She is not ill-appearing.   HENT:      Head: Normocephalic and atraumatic.      Mouth/Throat:      Mouth: Mucous membranes are moist.      Pharynx: Oropharynx is clear.   Eyes:      Conjunctiva/sclera: Conjunctivae normal.      Pupils: Pupils are equal, round, and reactive to light.   Cardiovascular:      Rate and Rhythm: Normal rate and regular rhythm.      Pulses: Normal pulses.      Heart sounds: Normal heart sounds.   Pulmonary:      Effort: Pulmonary effort is normal. No respiratory distress.      Breath sounds: Normal breath sounds.   Abdominal:      General: Bowel sounds are normal.      Palpations: Abdomen is soft.      Tenderness: There is no abdominal tenderness.   Musculoskeletal:         General: Normal range of motion.   Skin:     General: Skin is warm and dry.   Neurological:      General: No focal deficit present.      Mental Status: She is alert and oriented to person, place, and time.   Psychiatric:          "Mood and Affect: Mood normal.         Behavior: Behavior normal.         Thought Content: Thought content normal.         Judgment: Judgment normal.       /71 (BP Location: Left arm, Patient Position: Sitting, BP Cuff Size: Large adult)   Pulse 90   Temp 36.9 °C (98.4 °F) (Temporal)   Resp 16   Ht 1.725 m (5' 7.9\")   Wt 86.6 kg (191 lb)   LMP 03/17/2023 (Exact Date)   SpO2 98%   BMI 29.13 kg/m²       Assessment/Plan   Problem List Items Addressed This Visit       Major depressive disorder (Chronic)    Relevant Orders    TSH with reflex to Free T4 if abnormal    CBC and Auto Differential    Comprehensive Metabolic Panel    Vitamin D insufficiency - Primary    Relevant Orders    Vitamin D 25-Hydroxy,Total (for eval of Vitamin D levels)    Unable to lose weight    Relevant Medications    phentermine-topiramate (Qsymia) 11.25-69 mg capsule, ER multiphase 24 hr    phentermine-topiramate (Qsymia) 15-92 mg capsule, ER multiphase 24 hr    Other Relevant Orders    CBC and Auto Differential    Comprehensive Metabolic Panel    Referral to Clinical Pharmacy    Class 1 obesity due to excess calories without serious comorbidity with body mass index (BMI) of 30.0 to 30.9 in adult    Relevant Medications    phentermine-topiramate (Qsymia) 11.25-69 mg capsule, ER multiphase 24 hr    phentermine-topiramate (Qsymia) 15-92 mg capsule, ER multiphase 24 hr    Other Relevant Orders    Referral to Clinical Pharmacy     Other Visit Diagnoses       Lipid screening        Relevant Orders    Lipid Panel          Patient Instructions   Increase Qsymia to 11.25mg dose for 2 weeks, then 15mg dose afterwards. Follow-up in 3 months for physical, or sooner if needed. Encouraged to have fasting labs, and we will call with results when available. Call the office if any problems or concerns in the meantime.    "

## 2025-02-17 ENCOUNTER — APPOINTMENT (OUTPATIENT)
Dept: PHARMACY | Facility: HOSPITAL | Age: 40
End: 2025-02-17
Payer: COMMERCIAL

## 2025-02-21 NOTE — PATIENT INSTRUCTIONS
Increase Qsymia to 11.25mg dose for 2 weeks, then 15mg dose afterwards. Follow-up in 3 months for physical, or sooner if needed. Encouraged to have fasting labs, and we will call with results when available. Call the office if any problems or concerns in the meantime.

## 2025-02-27 ENCOUNTER — TELEMEDICINE (OUTPATIENT)
Dept: PHARMACY | Facility: HOSPITAL | Age: 40
End: 2025-02-27
Payer: COMMERCIAL

## 2025-02-27 DIAGNOSIS — R63.8 UNABLE TO LOSE WEIGHT: ICD-10-CM

## 2025-02-27 DIAGNOSIS — E66.09 CLASS 1 OBESITY DUE TO EXCESS CALORIES WITHOUT SERIOUS COMORBIDITY WITH BODY MASS INDEX (BMI) OF 30.0 TO 30.9 IN ADULT: ICD-10-CM

## 2025-02-27 DIAGNOSIS — E66.811 CLASS 1 OBESITY DUE TO EXCESS CALORIES WITHOUT SERIOUS COMORBIDITY WITH BODY MASS INDEX (BMI) OF 30.0 TO 30.9 IN ADULT: ICD-10-CM

## 2025-02-27 NOTE — PROGRESS NOTES
Clinical Pharmacy Appointment    Patient ID: Ninoska Crain is a 39 y.o. female who presents for No chief complaint on file..    Pt is here for First appointment.   Referring Provider: Johnathon Cotter APRN-*  PCP: JOSIAH Abrams-CNP, last visit: ***, next visit: ***    Subjective   HPI  PMH significant for Obesity, Hx small bowel obstruction ***, OAB, SARAH, migraine, depression/anxiety.  Special needs/barriers to therapy: {Needs/Barriers:34183}    Medication System Management  Patients preferred pharmacy: ***  Adherence/Organization: {Concerns?:99126}  Affordability/Accessibility: {Concerns?:11055}    Drug Interactions  The following drug interactions were noted:    Qsymia and sertraline - Concurrent use may increase the risk of serotonin syndrome       WEIGHT LOSS  BMI Readings from Last 1 Encounters:   02/10/25 29.13 kg/m²   Starting weight: 196 lbs. (7/23/24)  Previous weight: 191 lbs. (2/10/25)  Current weight: 188 lbs.  - does not weigh self often    Lab Results   Component Value Date    GLUCOSE 92 01/11/2024     Lifestyle  Diet: *** meals/day. *** 2 kids,  cooks, works as nurse (now in office)  BK: Eggs or toast w/ peanut/almond butter or breakfast sandwiches  LN: Varies - meal prep (ie: stir dent and rice) or from cafeteria  DN: Protein + veggies + complex carb  Snacks: *** Tries to have a snack between BK and LN (pb and manisha crackers, yogurt or protein bar), afternoon (not lately due to not thinking about it), bedtime non-fat greek yogurt w/ granola and peanut butter   Drinks: *** Coffee in AM, Celcius in afternoon, water at end of day when really thirsty - trying to drink more water   Has worked with nutritionist/dietician? {Yes/No:59131} Yes, in fall.   Physical Activity: *** Less than when she worked on floor at [employer] job. Joined a gym a year ago and tries to go twice a week (time limitations, tired when she finally has a chance to go after kids go to bed). Wants to walk in spring.      Other Potential Contributing Factors  Alcohol use: Average 0 drinks/week  Medications that may cause weight gain:  SSRI (sertraline)  Mental health: No current concerns Not currently seeing , has previously.   Eating Disorders: Denies Hx of any eating disorder  Comorbidities: urinary stress incontinence    Non-Pharmacological Therapy  Weight loss techniques attempted:  {Weight loss methods:66824}  Weight watcher  Self-directed dieting  Physical activity    Pharmacological Therapy  Current Medications:   Qsymia 11.25-69mg once daily (first week)  Previous Medications: *** phentermine (short-term, helped her focus), Contrave (didn't work)    Clarifications to above regimen: ***  Adverse Effects: ***    Insurance coverage of weight-loss medications? No, plan benefit exclusion  Eligible for copay cards/programs? Yes  Eligible for  PAP? N/A --> PAP     Medication Estimated Cost Expected weight loss Patient Risks/Cautions Notes   Wegovy (semaglutide) $650/month   w/ savings card 10-20% None      Zepbound (tirzepatide) $650/month   w/ savings card.  Vials available at lower cost via [employer] 10-20%     Qsymia (phentermine-topiramate) $98/month via Qsymia Engage 8-10% None Controlled substance   Contrave (bupropion-naltrexone) $99/month   via [employer] 5-10% None    Adipex (phentermine) ~$15/month 3-5% None Controlled substance,  Short-term use only   Alvaro (OTC Orlistat) ~$60/month 3-5%  Adverse effects likely outweigh benefit.        Objective   Allergies   Allergen Reactions    Aripiprazole Other     Reaction Date:akathesia     Social History     Social History Narrative    Not on file      Medication Review  Current Outpatient Medications   Medication Instructions    ascorbic acid (VITAMIN C) 1,000 mg, Daily    cholecalciferol (Vitamin D-3) 50 mcg (2,000 unit) capsule 1 capsule, Daily    multivitamin tablet 1 tablet, Daily    omega 3-dha-epa-fish oil (Fish OiL) 1,000 mg (120 mg-180 mg) capsule 2 times daily  "   phentermine-topiramate (Qsymia) 11.25-69 mg capsule, ER multiphase 24 hr 1 capsule, oral, Daily    phentermine-topiramate (Qsymia) 15-92 mg capsule, ER multiphase 24 hr 1 capsule, oral, Daily, TO BE TAKEN AFTER 11.25-69mg DOSE    sertraline (ZOLOFT) 200 mg, oral, Daily      Vitals  BP Readings from Last 2 Encounters:   02/10/25 108/71   11/01/24 104/70     BMI Readings from Last 1 Encounters:   02/10/25 29.13 kg/m²      Labs  A1C  No results found for: \"HGBA1C\"  BMP  Lab Results   Component Value Date    CALCIUM 8.9 01/11/2024     01/11/2024    K 4.3 01/11/2024    CO2 24 01/11/2024     01/11/2024    BUN 17 01/11/2024    CREATININE 0.63 01/11/2024    EGFR >90 01/11/2024     LFTs  Lab Results   Component Value Date    ALT 20 01/11/2024    AST 17 01/11/2024    ALKPHOS 61 01/11/2024    BILITOT 0.3 01/11/2024     FLP  Lab Results   Component Value Date    TRIG 112 01/11/2024    CHOL 215 (H) 01/11/2024    LDLF 116 (H) 12/07/2022    LDLCALC 146 (H) 01/11/2024    HDL 46.8 01/11/2024     Urine Microalbumin  No results found for: \"MICROALBCREA\"  Weight Management  Wt Readings from Last 3 Encounters:   02/10/25 86.6 kg (191 lb)   11/01/24 84.8 kg (187 lb)   09/16/24 85.5 kg (188 lb 7.9 oz)      There is no height or weight on file to calculate BMI.    Assessment/Plan   Problem List Items Addressed This Visit    None    Patient's current weight reported as *** lbs. (BMI 29) with a weight-related comorbidity.  Current regimen includes: ***  Rationale for plan: ***    Medication Changes:  {Medication Changes Plan:28777}     Patient Education:  {Patient Education:75520}    Clinical Pharmacist follow-up: ***, {In-Person / Telehealth:35062} visit  Patient is not followed in [facility].     Thank you,  Nyla Carpenter, [facility]  Clinical Pharmacy Specialist  [phone]    Continue all meds under the continuation of care with the referring provider and clinical pharmacy team.  Verbal consent to manage patient's drug therapy " was obtained from the patient. They were informed they may decline to participate or withdraw from participation in pharmacy services at any time.

## 2025-03-03 NOTE — ASSESSMENT & PLAN NOTE
Patient's current weight reported as 188 lbs. (BMI 29) with a weight-related comorbidity.  Current regimen includes: Qsymia 11.25-69mg once daily  Rationale for plan: Dose of Qsymia recently increased and will titrate to 15-92mg after 2 weeks. Experiencing some brain fog, previously took topiramate for migraines as young adult and experienced significant cognitive adverse effects. Discussed managing symptoms and maintaining good nutrition and sleep as body adjusts to medication. Reviewed alternative medication options and anticipated costs. Patient's insurance excludes coverage of all weight loss medications, and patient does not meet requirements for any alternative indication. Plan to continue current regimen, re-evaluate if insurance or medication cost change.     Medication Changes: None

## 2025-03-17 ENCOUNTER — PHARMACY VISIT (OUTPATIENT)
Dept: PHARMACY | Facility: CLINIC | Age: 40
End: 2025-03-17
Payer: COMMERCIAL

## 2025-03-17 DIAGNOSIS — R05.8 POST-VIRAL COUGH SYNDROME: Primary | ICD-10-CM

## 2025-03-17 PROCEDURE — RXMED WILLOW AMBULATORY MEDICATION CHARGE

## 2025-03-17 RX ORDER — METHYLPREDNISOLONE 4 MG/1
TABLET ORAL
Qty: 21 TABLET | Refills: 0 | Status: SHIPPED | OUTPATIENT
Start: 2025-03-17 | End: 2025-03-23

## 2025-03-20 ENCOUNTER — APPOINTMENT (OUTPATIENT)
Dept: RADIOLOGY | Facility: HOSPITAL | Age: 40
End: 2025-03-20
Payer: COMMERCIAL

## 2025-03-20 ENCOUNTER — HOSPITAL ENCOUNTER (EMERGENCY)
Facility: HOSPITAL | Age: 40
Discharge: HOME | End: 2025-03-20
Attending: STUDENT IN AN ORGANIZED HEALTH CARE EDUCATION/TRAINING PROGRAM
Payer: COMMERCIAL

## 2025-03-20 ENCOUNTER — APPOINTMENT (OUTPATIENT)
Dept: CARDIOLOGY | Facility: HOSPITAL | Age: 40
End: 2025-03-20
Payer: COMMERCIAL

## 2025-03-20 VITALS
HEART RATE: 72 BPM | TEMPERATURE: 97.3 F | OXYGEN SATURATION: 98 % | BODY MASS INDEX: 28.49 KG/M2 | RESPIRATION RATE: 20 BRPM | SYSTOLIC BLOOD PRESSURE: 120 MMHG | WEIGHT: 188 LBS | HEIGHT: 68 IN | DIASTOLIC BLOOD PRESSURE: 70 MMHG

## 2025-03-20 DIAGNOSIS — R11.0 NAUSEA: ICD-10-CM

## 2025-03-20 DIAGNOSIS — R10.84 GENERALIZED ABDOMINAL PAIN: Primary | ICD-10-CM

## 2025-03-20 LAB
ALBUMIN SERPL BCP-MCNC: 4.6 G/DL (ref 3.4–5)
ALP SERPL-CCNC: 56 U/L (ref 33–110)
ALT SERPL W P-5'-P-CCNC: 34 U/L (ref 7–45)
ANION GAP SERPL CALC-SCNC: 12 MMOL/L (ref 10–20)
APPEARANCE UR: CLEAR
AST SERPL W P-5'-P-CCNC: 20 U/L (ref 9–39)
BASOPHILS # BLD AUTO: 0.02 X10*3/UL (ref 0–0.1)
BASOPHILS NFR BLD AUTO: 0.1 %
BILIRUB SERPL-MCNC: 0.3 MG/DL (ref 0–1.2)
BILIRUB UR STRIP.AUTO-MCNC: NEGATIVE MG/DL
BUN SERPL-MCNC: 12 MG/DL (ref 6–23)
CALCIUM SERPL-MCNC: 9 MG/DL (ref 8.6–10.3)
CARDIAC TROPONIN I PNL SERPL HS: <3 NG/L (ref 0–13)
CHLORIDE SERPL-SCNC: 109 MMOL/L (ref 98–107)
CO2 SERPL-SCNC: 22 MMOL/L (ref 21–32)
COLOR UR: NORMAL
CREAT SERPL-MCNC: 0.64 MG/DL (ref 0.5–1.05)
EGFRCR SERPLBLD CKD-EPI 2021: >90 ML/MIN/1.73M*2
EOSINOPHIL # BLD AUTO: 0.01 X10*3/UL (ref 0–0.7)
EOSINOPHIL NFR BLD AUTO: 0.1 %
ERYTHROCYTE [DISTWIDTH] IN BLOOD BY AUTOMATED COUNT: 12.6 % (ref 11.5–14.5)
FLUAV RNA RESP QL NAA+PROBE: NOT DETECTED
FLUBV RNA RESP QL NAA+PROBE: NOT DETECTED
GLUCOSE SERPL-MCNC: 117 MG/DL (ref 74–99)
GLUCOSE UR STRIP.AUTO-MCNC: NORMAL MG/DL
HCT VFR BLD AUTO: 38.7 % (ref 36–46)
HGB BLD-MCNC: 13 G/DL (ref 12–16)
IMM GRANULOCYTES # BLD AUTO: 0.07 X10*3/UL (ref 0–0.7)
IMM GRANULOCYTES NFR BLD AUTO: 0.5 % (ref 0–0.9)
KETONES UR STRIP.AUTO-MCNC: NEGATIVE MG/DL
LACTATE SERPL-SCNC: 0.7 MMOL/L (ref 0.4–2)
LEUKOCYTE ESTERASE UR QL STRIP.AUTO: NEGATIVE
LIPASE SERPL-CCNC: 64 U/L (ref 9–82)
LYMPHOCYTES # BLD AUTO: 1.43 X10*3/UL (ref 1.2–4.8)
LYMPHOCYTES NFR BLD AUTO: 10.3 %
MCH RBC QN AUTO: 30.9 PG (ref 26–34)
MCHC RBC AUTO-ENTMCNC: 33.6 G/DL (ref 32–36)
MCV RBC AUTO: 92 FL (ref 80–100)
MONOCYTES # BLD AUTO: 0.31 X10*3/UL (ref 0.1–1)
MONOCYTES NFR BLD AUTO: 2.2 %
NEUTROPHILS # BLD AUTO: 12.03 X10*3/UL (ref 1.2–7.7)
NEUTROPHILS NFR BLD AUTO: 86.8 %
NITRITE UR QL STRIP.AUTO: NEGATIVE
NRBC BLD-RTO: 0 /100 WBCS (ref 0–0)
PH UR STRIP.AUTO: 7.5 [PH]
PLATELET # BLD AUTO: 333 X10*3/UL (ref 150–450)
POTASSIUM SERPL-SCNC: 3.9 MMOL/L (ref 3.5–5.3)
PROT SERPL-MCNC: 7.8 G/DL (ref 6.4–8.2)
PROT UR STRIP.AUTO-MCNC: NEGATIVE MG/DL
RBC # BLD AUTO: 4.21 X10*6/UL (ref 4–5.2)
RBC # UR STRIP.AUTO: NEGATIVE MG/DL
RSV RNA RESP QL NAA+PROBE: NOT DETECTED
SARS-COV-2 RNA RESP QL NAA+PROBE: NOT DETECTED
SODIUM SERPL-SCNC: 139 MMOL/L (ref 136–145)
SP GR UR STRIP.AUTO: 1.01
UROBILINOGEN UR STRIP.AUTO-MCNC: NORMAL MG/DL
WBC # BLD AUTO: 13.9 X10*3/UL (ref 4.4–11.3)

## 2025-03-20 PROCEDURE — 96372 THER/PROPH/DIAG INJ SC/IM: CPT | Performed by: STUDENT IN AN ORGANIZED HEALTH CARE EDUCATION/TRAINING PROGRAM

## 2025-03-20 PROCEDURE — 87637 SARSCOV2&INF A&B&RSV AMP PRB: CPT

## 2025-03-20 PROCEDURE — 71045 X-RAY EXAM CHEST 1 VIEW: CPT

## 2025-03-20 PROCEDURE — 83605 ASSAY OF LACTIC ACID: CPT

## 2025-03-20 PROCEDURE — 74177 CT ABD & PELVIS W/CONTRAST: CPT

## 2025-03-20 PROCEDURE — 71045 X-RAY EXAM CHEST 1 VIEW: CPT | Performed by: STUDENT IN AN ORGANIZED HEALTH CARE EDUCATION/TRAINING PROGRAM

## 2025-03-20 PROCEDURE — 99285 EMERGENCY DEPT VISIT HI MDM: CPT | Mod: 25 | Performed by: STUDENT IN AN ORGANIZED HEALTH CARE EDUCATION/TRAINING PROGRAM

## 2025-03-20 PROCEDURE — 2500000004 HC RX 250 GENERAL PHARMACY W/ HCPCS (ALT 636 FOR OP/ED): Performed by: STUDENT IN AN ORGANIZED HEALTH CARE EDUCATION/TRAINING PROGRAM

## 2025-03-20 PROCEDURE — 96360 HYDRATION IV INFUSION INIT: CPT | Mod: 59

## 2025-03-20 PROCEDURE — 80053 COMPREHEN METABOLIC PANEL: CPT | Performed by: STUDENT IN AN ORGANIZED HEALTH CARE EDUCATION/TRAINING PROGRAM

## 2025-03-20 PROCEDURE — 84484 ASSAY OF TROPONIN QUANT: CPT

## 2025-03-20 PROCEDURE — 85025 COMPLETE CBC W/AUTO DIFF WBC: CPT | Performed by: STUDENT IN AN ORGANIZED HEALTH CARE EDUCATION/TRAINING PROGRAM

## 2025-03-20 PROCEDURE — 36415 COLL VENOUS BLD VENIPUNCTURE: CPT | Performed by: STUDENT IN AN ORGANIZED HEALTH CARE EDUCATION/TRAINING PROGRAM

## 2025-03-20 PROCEDURE — 93005 ELECTROCARDIOGRAM TRACING: CPT

## 2025-03-20 PROCEDURE — 83690 ASSAY OF LIPASE: CPT | Performed by: STUDENT IN AN ORGANIZED HEALTH CARE EDUCATION/TRAINING PROGRAM

## 2025-03-20 PROCEDURE — 36415 COLL VENOUS BLD VENIPUNCTURE: CPT

## 2025-03-20 PROCEDURE — 74177 CT ABD & PELVIS W/CONTRAST: CPT | Performed by: RADIOLOGY

## 2025-03-20 PROCEDURE — 81003 URINALYSIS AUTO W/O SCOPE: CPT | Performed by: STUDENT IN AN ORGANIZED HEALTH CARE EDUCATION/TRAINING PROGRAM

## 2025-03-20 PROCEDURE — 87075 CULTR BACTERIA EXCEPT BLOOD: CPT | Mod: STJLAB

## 2025-03-20 PROCEDURE — 2550000001 HC RX 255 CONTRASTS: Performed by: STUDENT IN AN ORGANIZED HEALTH CARE EDUCATION/TRAINING PROGRAM

## 2025-03-20 PROCEDURE — 2500000004 HC RX 250 GENERAL PHARMACY W/ HCPCS (ALT 636 FOR OP/ED)

## 2025-03-20 RX ORDER — ONDANSETRON HYDROCHLORIDE 2 MG/ML
4 INJECTION, SOLUTION INTRAVENOUS ONCE
Status: COMPLETED | OUTPATIENT
Start: 2025-03-20 | End: 2025-03-20

## 2025-03-20 RX ORDER — FLUTICASONE PROPIONATE 50 MCG
2 SPRAY, SUSPENSION (ML) NASAL DAILY
COMMUNITY

## 2025-03-20 RX ORDER — WATER
500 LIQUID (ML) MISCELLANEOUS ONCE
Status: COMPLETED | OUTPATIENT
Start: 2025-03-20 | End: 2025-03-20

## 2025-03-20 RX ORDER — LORATADINE 10 MG/1
10 TABLET ORAL DAILY
COMMUNITY

## 2025-03-20 RX ORDER — DICYCLOMINE HYDROCHLORIDE 10 MG/ML
10 INJECTION INTRAMUSCULAR ONCE
Status: COMPLETED | OUTPATIENT
Start: 2025-03-20 | End: 2025-03-20

## 2025-03-20 RX ORDER — MORPHINE SULFATE 4 MG/ML
4 INJECTION, SOLUTION INTRAMUSCULAR; INTRAVENOUS ONCE
Status: COMPLETED | OUTPATIENT
Start: 2025-03-20 | End: 2025-03-20

## 2025-03-20 RX ORDER — ONDANSETRON 4 MG/1
4 TABLET, ORALLY DISINTEGRATING ORAL EVERY 8 HOURS PRN
Qty: 20 TABLET | Refills: 0 | Status: SHIPPED | OUTPATIENT
Start: 2025-03-20 | End: 2025-03-27

## 2025-03-20 RX ORDER — DICYCLOMINE HYDROCHLORIDE 20 MG/1
20 TABLET ORAL 2 TIMES DAILY
Qty: 20 TABLET | Refills: 0 | Status: SHIPPED | OUTPATIENT
Start: 2025-03-20 | End: 2025-03-30

## 2025-03-20 RX ADMIN — DICYCLOMINE HYDROCHLORIDE 10 MG: 10 INJECTION INTRAMUSCULAR at 22:41

## 2025-03-20 RX ADMIN — MORPHINE SULFATE 4 MG: 4 INJECTION, SOLUTION INTRAMUSCULAR; INTRAVENOUS at 20:42

## 2025-03-20 RX ADMIN — Medication 500 ML: at 22:44

## 2025-03-20 RX ADMIN — IOHEXOL 75 ML: 350 INJECTION, SOLUTION INTRAVENOUS at 21:03

## 2025-03-20 RX ADMIN — SODIUM CHLORIDE 1000 ML: 9 INJECTION, SOLUTION INTRAVENOUS at 20:42

## 2025-03-20 RX ADMIN — ONDANSETRON 4 MG: 2 INJECTION INTRAMUSCULAR; INTRAVENOUS at 20:42

## 2025-03-20 ASSESSMENT — COLUMBIA-SUICIDE SEVERITY RATING SCALE - C-SSRS
1. IN THE PAST MONTH, HAVE YOU WISHED YOU WERE DEAD OR WISHED YOU COULD GO TO SLEEP AND NOT WAKE UP?: NO
6. HAVE YOU EVER DONE ANYTHING, STARTED TO DO ANYTHING, OR PREPARED TO DO ANYTHING TO END YOUR LIFE?: NO
2. HAVE YOU ACTUALLY HAD ANY THOUGHTS OF KILLING YOURSELF?: NO

## 2025-03-20 ASSESSMENT — PAIN DESCRIPTION - LOCATION: LOCATION: ABDOMEN

## 2025-03-20 ASSESSMENT — LIFESTYLE VARIABLES
HAVE YOU EVER FELT YOU SHOULD CUT DOWN ON YOUR DRINKING: NO
TOTAL SCORE: 0
EVER HAD A DRINK FIRST THING IN THE MORNING TO STEADY YOUR NERVES TO GET RID OF A HANGOVER: NO
HAVE PEOPLE ANNOYED YOU BY CRITICIZING YOUR DRINKING: NO
EVER FELT BAD OR GUILTY ABOUT YOUR DRINKING: NO

## 2025-03-20 ASSESSMENT — PAIN SCALES - GENERAL
PAINLEVEL_OUTOF10: 9
PAINLEVEL_OUTOF10: 8

## 2025-03-20 ASSESSMENT — PAIN - FUNCTIONAL ASSESSMENT: PAIN_FUNCTIONAL_ASSESSMENT: 0-10

## 2025-03-20 ASSESSMENT — PAIN DESCRIPTION - ORIENTATION: ORIENTATION: LEFT;LOWER

## 2025-03-20 NOTE — ED TRIAGE NOTES
"L lower and mid abdominal pain starting this morning; patient states that this feels similar to when she had the R side pain in 2023 when \"part of her intestine looped and came through her bladder mesh\".  "

## 2025-03-20 NOTE — ED PROVIDER NOTES
Emergency Department Provider Note        History of Present Illness     History provided by: Patient  Limitations to History: None  External Records Reviewed with Brief Summary:  Medical chart review    HPI:  Ninoska Crain is a 39 y.o. female who presents to the emergency department for left lower and middle abdominal pain since this morning that feels similar to when she had an incarcerated hernia that went through her bladder mesh in 2023, however at that time she had pain on the right side today she has pain on the left side.  Dr. Dillon is her surgeon for colorectal chart review shows that the patient had arrived with a chief complaint of abdominal pain, is 10 out of 10 on 11/24/2023, subsequently admitted to this department and seen by Dr. Dillon after dosage with Zofran and pain management.  Had elevated lactate at that time.  Patient does have medical history significant for prior small bowel obstruction/incarcerated hernia 2 years ago, obesity, laparoscopic diagnostic surgery, hysterectomy, hernia repair, major depressive order, currently on phentermine, Zoloft.  Patient does endorse soft stools mild nausea without vomiting, denies blood in stool blood in urine, denies syncopal episodes, states she has had hysterectomy no longer receives menstrual period.  She has no chest pain, denies shortness of breath, denies headache, denies syncopal episodes.    Physical Exam   Triage vitals:  T 36.7 °C (98.1 °F)  HR 97  /87  RR 18  O2 98 % None (Room air)    General: Awake, alert, in no acute distress  Eyes: Gaze conjugate.  No scleral icterus or injection  HENT: Normo-cephalic, atraumatic. No stridor  CV: Regular rate, regular rhythm. Radial pulses 2+ bilaterally  Resp: Breathing non-labored, speaking in full sentences.  Clear to auscultation bilaterally  GI: Soft, non-distended, mild tenderness to her left lower quadrant, otherwise nontender non-tender. No rebound or guarding.  : Deferred  MSK/Extremities: No  gross bony deformities. Moving all extremities  Skin: Warm. Appropriate color  Neuro: Alert. Oriented. Face symmetric. Speech is fluent.  Gross strength and sensation intact in b/l UE and LEs  Psych: Appropriate mood and affect    Medical Decision Making & ED Course   Medical Decision Makin y.o. female arrives for chief complaint of left lower abdominal pain that she states similar to when she had incarcerated hernia and small bowel obstruction on a prior visit 2 years ago however on the opposite side.  Patient does have a history of obesity as well as history of laparoscopic diagnostic surgeries, hysterectomy, hernia repair, as well as major depressive disorder, lactate, CBC, CMP, lipase, influenza/COVID, urinalysis ordered for evaluation and to rule out causes of intra-abdominal pathology.  CT of the abdomen pelvis was ordered to evaluate for small bowel obstruction/incarcerated hernia.  At approximately 1955 the initial white blood cell count came back, due to this and heart rate of 97 patient triggered SIRS protocol, blood cultures, chest x-ray, twelve-lead ECG, 1 L bolus lactated Ringer's over 2 hours as the patient is not showing signs of hypotension or shock, blood pressure stable at 157/87.  Patient given a dose of morphine 4 mg for pain as well as Zofran for nausea prophylaxis.  Patient made n.p.o. at this time.  ----      Differential diagnoses considered include but are not limited to: Cholecystitis, appendicitis, bowel obstruction, gastroenteritis, constipation, incarceration     Social Determinants of Health which Significantly Impact Care: None identified     EKG Independent Interpretation: EKG interpreted by myself. Please see ED Course for full interpretation.    Independent Result Review and Interpretation: Relevant laboratory and radiographic results were reviewed and independently interpreted by myself.  As necessary, they are commented on in the ED Course.    Chronic conditions affecting  the patient's care: As documented above in [facility]    The patient was discussed with the following consultants/services: None    Care Considerations: As documented above in [facility]    ED Course:  ED Course as of 03/20/25 2301   Thu Mar 20, 2025   2103 Chest Radiograph interpretation: No acute cardiopulmonary process.  No pneumothorax, widened mediastinum, pneumonia. [TL]   2231 Coronavirus 2019, PCR: Not Detected [PV]   2232 Flu A Result: Not Detected [PV]   2232 RSV PCR: Not Detected [PV]   2232 CT abdomen pelvis w IV contrast  No acute abdominal or pelvic process. [PV]   2234 CBC CMP unremarkable influenza COVID RSV negative urinalysis shows no signs of urinary tract infection.  Patient is continuing to complain of pain.  To be given a dose of Bentyl. [PV]   2237 Lactate -0.7, troponin negative less than 1, do not suspect sepsis at this time after pain management patient's heart rate improved to 65 temperatures remained at 98.1. [PV]   2247 Patient signed out to Dr. Hunt.  [TL]   2301 Prescription for Bentyl and Zofran were sent to the patient's pharmacy, patient states she understands plan of care, she was given strict return precautions, states that she will follow-up with her primary care provider.  Patient discharged home. [PV]      ED Course User Index  [PV] Ricky Ortiz DO  [TL] Cisco Mukherjee DO         Diagnoses as of 03/20/25 2301   Generalized abdominal pain   Nausea     Disposition   As a result of the work-up, the patient was discharged home.  she was informed of her diagnosis and instructed to come back with any concerns or worsening of condition.  she and was agreeable to the plan as discussed above.  she was given the opportunity to ask questions.  All of the patient's questions were answered.    Procedures   Procedures    Patient seen and discussed with ED attending physician.    Ricky Ortiz DO  Emergency Medicine     Ricky Ortiz DO  Resident  03/20/25 2301

## 2025-03-21 LAB
ATRIAL RATE: 68 BPM
HOLD SPECIMEN: NORMAL
P AXIS: 50 DEGREES
P OFFSET: 197 MS
P ONSET: 143 MS
PR INTERVAL: 154 MS
Q ONSET: 220 MS
QRS COUNT: 12 BEATS
QRS DURATION: 72 MS
QT INTERVAL: 400 MS
QTC CALCULATION(BAZETT): 425 MS
QTC FREDERICIA: 417 MS
R AXIS: 45 DEGREES
T AXIS: 50 DEGREES
T OFFSET: 420 MS
VENTRICULAR RATE: 68 BPM

## 2025-03-21 NOTE — DISCHARGE INSTRUCTIONS
Please follow-up with your primary care provider to let them know you are seen and evaluated in the emergency department.  Additionally please return to the emergency department immediately if you have worsening of your symptoms, vomiting that cannot be controlled with Zofran, or if you are having significant worsening of your symptoms.  You can always return to any emergency department anytime if you need be reevaluated.

## 2025-03-23 LAB
BACTERIA BLD CULT: NORMAL
BACTERIA BLD CULT: NORMAL

## 2025-03-25 LAB
BACTERIA BLD CULT: NORMAL
BACTERIA BLD CULT: NORMAL

## 2025-03-31 LAB
ATRIAL RATE: 68 BPM
P AXIS: 50 DEGREES
P OFFSET: 197 MS
P ONSET: 143 MS
PR INTERVAL: 154 MS
Q ONSET: 220 MS
QRS COUNT: 12 BEATS
QRS DURATION: 72 MS
QT INTERVAL: 400 MS
QTC CALCULATION(BAZETT): 425 MS
QTC FREDERICIA: 417 MS
R AXIS: 45 DEGREES
T AXIS: 50 DEGREES
T OFFSET: 420 MS
VENTRICULAR RATE: 68 BPM

## 2025-03-31 PROCEDURE — RXMED WILLOW AMBULATORY MEDICATION CHARGE

## 2025-04-03 ENCOUNTER — PHARMACY VISIT (OUTPATIENT)
Dept: PHARMACY | Facility: CLINIC | Age: 40
End: 2025-04-03
Payer: COMMERCIAL

## 2025-04-21 ENCOUNTER — PATIENT MESSAGE (OUTPATIENT)
Dept: PHARMACY | Facility: HOSPITAL | Age: 40
End: 2025-04-21
Payer: COMMERCIAL

## 2025-04-21 DIAGNOSIS — E66.09 CLASS 1 OBESITY DUE TO EXCESS CALORIES WITHOUT SERIOUS COMORBIDITY WITH BODY MASS INDEX (BMI) OF 30.0 TO 30.9 IN ADULT: Primary | ICD-10-CM

## 2025-04-21 DIAGNOSIS — E66.811 CLASS 1 OBESITY DUE TO EXCESS CALORIES WITHOUT SERIOUS COMORBIDITY WITH BODY MASS INDEX (BMI) OF 30.0 TO 30.9 IN ADULT: Primary | ICD-10-CM

## 2025-04-23 ENCOUNTER — APPOINTMENT (OUTPATIENT)
Dept: OBSTETRICS AND GYNECOLOGY | Facility: CLINIC | Age: 40
End: 2025-04-23
Payer: COMMERCIAL

## 2025-04-25 LAB
25(OH)D3+25(OH)D2 SERPL-MCNC: 40 NG/ML (ref 30–100)
ALBUMIN SERPL-MCNC: 4.5 G/DL (ref 3.6–5.1)
ALP SERPL-CCNC: 55 U/L (ref 31–125)
ALT SERPL-CCNC: 31 U/L (ref 6–29)
ANION GAP SERPL CALCULATED.4IONS-SCNC: 7 MMOL/L (CALC) (ref 7–17)
AST SERPL-CCNC: 19 U/L (ref 10–30)
BASOPHILS # BLD AUTO: 48 CELLS/UL (ref 0–200)
BASOPHILS NFR BLD AUTO: 0.5 %
BILIRUB SERPL-MCNC: 0.5 MG/DL (ref 0.2–1.2)
BUN SERPL-MCNC: 14 MG/DL (ref 7–25)
CALCIUM SERPL-MCNC: 9.4 MG/DL (ref 8.6–10.2)
CHLORIDE SERPL-SCNC: 106 MMOL/L (ref 98–110)
CHOLEST SERPL-MCNC: 218 MG/DL
CHOLEST/HDLC SERPL: 3.8 (CALC)
CO2 SERPL-SCNC: 25 MMOL/L (ref 20–32)
CREAT SERPL-MCNC: 0.68 MG/DL (ref 0.5–0.97)
EGFRCR SERPLBLD CKD-EPI 2021: 114 ML/MIN/1.73M2
EOSINOPHIL # BLD AUTO: 163 CELLS/UL (ref 15–500)
EOSINOPHIL NFR BLD AUTO: 1.7 %
ERYTHROCYTE [DISTWIDTH] IN BLOOD BY AUTOMATED COUNT: 11.9 % (ref 11–15)
GLUCOSE SERPL-MCNC: 96 MG/DL (ref 65–99)
HCT VFR BLD AUTO: 41.6 % (ref 35–45)
HDLC SERPL-MCNC: 58 MG/DL
HGB BLD-MCNC: 13.3 G/DL (ref 11.7–15.5)
LDLC SERPL CALC-MCNC: 139 MG/DL (CALC)
LYMPHOCYTES # BLD AUTO: 2083 CELLS/UL (ref 850–3900)
LYMPHOCYTES NFR BLD AUTO: 21.7 %
MCH RBC QN AUTO: 30.2 PG (ref 27–33)
MCHC RBC AUTO-ENTMCNC: 32 G/DL (ref 32–36)
MCV RBC AUTO: 94.5 FL (ref 80–100)
MONOCYTES # BLD AUTO: 547 CELLS/UL (ref 200–950)
MONOCYTES NFR BLD AUTO: 5.7 %
NEUTROPHILS # BLD AUTO: 6758 CELLS/UL (ref 1500–7800)
NEUTROPHILS NFR BLD AUTO: 70.4 %
NONHDLC SERPL-MCNC: 160 MG/DL (CALC)
PLATELET # BLD AUTO: 309 THOUSAND/UL (ref 140–400)
PMV BLD REES-ECKER: 9.5 FL (ref 7.5–12.5)
POTASSIUM SERPL-SCNC: 4.8 MMOL/L (ref 3.5–5.3)
PROT SERPL-MCNC: 7.3 G/DL (ref 6.1–8.1)
RBC # BLD AUTO: 4.4 MILLION/UL (ref 3.8–5.1)
SODIUM SERPL-SCNC: 138 MMOL/L (ref 135–146)
TRIGL SERPL-MCNC: 100 MG/DL
TSH SERPL-ACNC: 1.22 MIU/L
WBC # BLD AUTO: 9.6 THOUSAND/UL (ref 3.8–10.8)

## 2025-04-27 DIAGNOSIS — E78.2 MIXED HYPERLIPIDEMIA: Primary | ICD-10-CM

## 2025-05-05 ENCOUNTER — APPOINTMENT (OUTPATIENT)
Dept: PRIMARY CARE | Facility: CLINIC | Age: 40
End: 2025-05-05
Payer: COMMERCIAL

## 2025-05-05 VITALS
DIASTOLIC BLOOD PRESSURE: 72 MMHG | SYSTOLIC BLOOD PRESSURE: 128 MMHG | WEIGHT: 188.9 LBS | BODY MASS INDEX: 28.63 KG/M2 | HEIGHT: 68 IN | TEMPERATURE: 97.7 F | RESPIRATION RATE: 16 BRPM | OXYGEN SATURATION: 97 % | HEART RATE: 63 BPM

## 2025-05-05 DIAGNOSIS — F41.9 ANXIETY DISORDER, UNSPECIFIED TYPE: ICD-10-CM

## 2025-05-05 DIAGNOSIS — E66.9 OBESITY DUE TO ENERGY IMBALANCE: ICD-10-CM

## 2025-05-05 DIAGNOSIS — F32.9 MAJOR DEPRESSIVE DISORDER, REMISSION STATUS UNSPECIFIED, UNSPECIFIED WHETHER RECURRENT: Chronic | ICD-10-CM

## 2025-05-05 DIAGNOSIS — Z00.00 PHYSICAL EXAM, ANNUAL: Primary | ICD-10-CM

## 2025-05-05 PROCEDURE — 99395 PREV VISIT EST AGE 18-39: CPT | Performed by: NURSE PRACTITIONER

## 2025-05-05 PROCEDURE — 1036F TOBACCO NON-USER: CPT | Performed by: NURSE PRACTITIONER

## 2025-05-05 PROCEDURE — 3008F BODY MASS INDEX DOCD: CPT | Performed by: NURSE PRACTITIONER

## 2025-05-05 RX ORDER — CALCIUM CARBONATE 300MG(750)
500 TABLET,CHEWABLE ORAL DAILY
COMMUNITY

## 2025-05-05 RX ORDER — SERTRALINE HYDROCHLORIDE 100 MG/1
200 TABLET, FILM COATED ORAL DAILY
Qty: 180 TABLET | Refills: 3 | Status: SHIPPED | OUTPATIENT
Start: 2025-05-05 | End: 2026-04-30

## 2025-05-05 ASSESSMENT — ENCOUNTER SYMPTOMS
COUGH: 0
DIFFICULTY URINATING: 0
PALPITATIONS: 0
DECREASED CONCENTRATION: 0
MYALGIAS: 0
EYE ITCHING: 0
WHEEZING: 0
CHILLS: 0
SINUS PRESSURE: 0
VOMITING: 0
RHINORRHEA: 0
BACK PAIN: 0
FATIGUE: 0
COLOR CHANGE: 0
HEADACHES: 0
CONSTIPATION: 0
NAUSEA: 0
ADENOPATHY: 0
DIARRHEA: 0
SORE THROAT: 0
FEVER: 0
EYE PAIN: 0
EYE DISCHARGE: 0
DYSURIA: 0
JOINT SWELLING: 0
NERVOUS/ANXIOUS: 0
SHORTNESS OF BREATH: 0

## 2025-05-05 ASSESSMENT — PATIENT HEALTH QUESTIONNAIRE - PHQ9
SUM OF ALL RESPONSES TO PHQ9 QUESTIONS 1 AND 2: 0
2. FEELING DOWN, DEPRESSED OR HOPELESS: NOT AT ALL
1. LITTLE INTEREST OR PLEASURE IN DOING THINGS: NOT AT ALL

## 2025-05-05 NOTE — PATIENT INSTRUCTIONS
Reviewed labs in detail. Patient to continue medications as ordered. Have fasting labs drawn, and we will call with results when available. Follow-up in 6 months, or sooner if needed. Call the office if any problems or concerns in the meantime.

## 2025-05-05 NOTE — PROGRESS NOTES
"Subjective   Ninoska Crain is a 39 y.o. female who presents for Annual Exam.      Well Adult Physical   Patient here for a comprehensive physical exam.The patient reports no problems    Do you take any herbs or supplements that were not prescribed by a doctor? no   Are you taking calcium supplements? no   Are you taking aspirin daily? no     History:  LMP: Patient's last menstrual period was 03/17/2023 (exact date).    Last pap date: 04/09/2024  Abnormal pap? no    Patient stopped qsymia and started tirzepatide vials. She has some nausea and constipation, which has improved with OTC meds.      Review of Systems   Constitutional:  Negative for chills, fatigue and fever.   HENT:  Negative for congestion, ear pain, rhinorrhea, sinus pressure and sore throat.    Eyes:  Negative for pain, discharge and itching.   Respiratory:  Negative for cough, shortness of breath and wheezing.    Cardiovascular:  Negative for chest pain and palpitations.   Gastrointestinal:  Negative for constipation, diarrhea, nausea and vomiting.   Genitourinary:  Negative for difficulty urinating and dysuria.   Musculoskeletal:  Negative for back pain, joint swelling and myalgias.   Skin:  Negative for color change.   Neurological:  Negative for headaches.   Hematological:  Negative for adenopathy.   Psychiatric/Behavioral:  Negative for decreased concentration. The patient is not nervous/anxious.        Objective   /72 (BP Location: Left arm, Patient Position: Sitting, BP Cuff Size: Adult)   Pulse 63   Temp 36.5 °C (97.7 °F) (Temporal)   Resp 16   Ht 1.727 m (5' 8\")   Wt 85.7 kg (188 lb 14.4 oz)   LMP 03/17/2023 (Exact Date)   SpO2 97%   BMI 28.72 kg/m²       Physical Exam  Constitutional:       General: She is not in acute distress.     Appearance: She is not ill-appearing.   HENT:      Head: Normocephalic and atraumatic.      Right Ear: Tympanic membrane, ear canal and external ear normal.      Left Ear: Tympanic membrane, ear " canal and external ear normal.      Nose: Nose normal.      Mouth/Throat:      Mouth: Mucous membranes are moist.      Pharynx: Oropharynx is clear.   Eyes:      Conjunctiva/sclera: Conjunctivae normal.      Pupils: Pupils are equal, round, and reactive to light.   Cardiovascular:      Rate and Rhythm: Normal rate and regular rhythm.      Pulses: Normal pulses.      Heart sounds: Normal heart sounds.   Pulmonary:      Effort: Pulmonary effort is normal. No respiratory distress.      Breath sounds: Normal breath sounds.   Abdominal:      General: Bowel sounds are normal.      Palpations: Abdomen is soft.      Tenderness: There is no abdominal tenderness.   Musculoskeletal:         General: Normal range of motion.   Skin:     General: Skin is warm and dry.   Neurological:      General: No focal deficit present.      Mental Status: She is alert and oriented to person, place, and time.   Psychiatric:         Mood and Affect: Mood normal.         Behavior: Behavior normal.         Thought Content: Thought content normal.         Judgment: Judgment normal.         Assessment & Plan  Physical exam, annual         Major depressive disorder, remission status unspecified, unspecified whether recurrent    Orders:    sertraline (Zoloft) 100 mg tablet; Take 2 tablets (200 mg) by mouth once daily.    Anxiety disorder, unspecified type         Obesity due to energy imbalance            Patient Instructions   Reviewed labs in detail. Patient to continue medications as ordered. Have fasting labs drawn, and we will call with results when available. Follow-up in 6 months, or sooner if needed. Call the office if any problems or concerns in the meantime.

## 2025-05-08 ENCOUNTER — APPOINTMENT (OUTPATIENT)
Dept: PHARMACY | Facility: HOSPITAL | Age: 40
End: 2025-05-08
Payer: COMMERCIAL

## 2025-05-08 DIAGNOSIS — E66.811 CLASS 1 OBESITY DUE TO EXCESS CALORIES WITHOUT SERIOUS COMORBIDITY WITH BODY MASS INDEX (BMI) OF 30.0 TO 30.9 IN ADULT: Primary | ICD-10-CM

## 2025-05-08 DIAGNOSIS — E66.09 CLASS 1 OBESITY DUE TO EXCESS CALORIES WITHOUT SERIOUS COMORBIDITY WITH BODY MASS INDEX (BMI) OF 30.0 TO 30.9 IN ADULT: Primary | ICD-10-CM

## 2025-05-08 NOTE — PROGRESS NOTES
Clinical Pharmacy Appointment    Patient ID: Ninoska Crain is a 39 y.o. female who presents for Obesity.    Pt is here for Follow Up appointment.   Referring Provider: Johnathon Cotter APRN-*  PCP: JOSIAH Abrams-CNP, last visit: 5/5/25, next visit: 11/3/25    Subjective   HPI  PMH significant for Obesity, Hx internal hernia and SBO, OAB and SARAH, migraine, depression/anxiety.  Special needs/barriers to therapy: None identified    Medication System Management  Patients preferred pharmacy: [facility]  [facility] for Zepbound  Adherence/Organization: No current concerns  Affordability/Accessibility: No current concerns    Drug Interactions  No relevant drug interactions were noted.      WEIGHT MANAGEMENT  BMI Readings from Last 1 Encounters:   05/05/25 28.72 kg/m²   Starting weight: 188 lbs. (3/20/25)  Previous weight: 188 lbs. (5/5/25)  Current weight: 186.8 lbs.    Weight Goals  Next goal: Weight -5% (179 lbs.)  Maintenance BMI Goal: 18.5 to 24.9  Maintenance Weight Goal: 122 to 164 lbs.  Patient defined goal(s): 145 lbs.    Lifestyle  Diet: Has noticed some appetite suppression, portions are smaller. Trying to have 6 small meals/day.  Has worked with nutritionist/dietician? Yes, previously  Physical Activity: Not reviewed    Pertinent PMH Review  Comorbidities: dyslipidemias and urinary stress incontinence  Hx or FH Hx of MTC/MEN2?: No  Pancreatitis?: No  Gastroparesis?: No  Cholecystitis?: No  Hx incarcerated hernia and small bowel obstruction 2023    Pharmacological Therapy  Current Medications:   Zepbound 2.5mg vials once weekly (Fridays, 2 doses completed)  Previous Medications: phentermine (short-term, helped her focus), Contrave (ineffective), Qsymia (ineffective)    Clarifications to above regimen: None  Adverse Effects: Nausea and cramping for 2 days after first dose, now resolved. Mild constipation.    Insurance coverage of weight-loss medications? No, Plan benefit  "exclusion  Eligible for copay cards/programs? Yes  Eligible for  PAP? N/A      Objective   Allergies[1]  Social History     Social History Narrative    Not on file      Medication Review  Current Outpatient Medications   Medication Instructions    ascorbic acid (VITAMIN C) 1,000 mg, Daily    cholecalciferol (Vitamin D-3) 50 mcg (2,000 unit) capsule 1 capsule, Daily    fluticasone (Flonase) 50 mcg/actuation nasal spray 2 sprays, Daily    loratadine (CLARITIN) 10 mg, Daily    magnesium oxide (MAG-OX) 500 mg, Daily    multivitamin tablet 1 tablet, Daily    omega 3-dha-epa-fish oil (Fish OiL) 1,000 mg (120 mg-180 mg) capsule 1 capsule, 2 times daily    sertraline (ZOLOFT) 200 mg, oral, Daily    tirzepatide, weight loss, 5 mg/0.5 mL solution 1 vial, subcutaneous, Every 7 days      Vitals  BP Readings from Last 2 Encounters:   05/05/25 128/72   03/20/25 120/70     BMI Readings from Last 1 Encounters:   05/05/25 28.72 kg/m²      Labs  A1C  No results found for: \"HGBA1C\"  BMP  Lab Results   Component Value Date    CALCIUM 9.4 04/24/2025     04/24/2025    K 4.8 04/24/2025    CO2 25 04/24/2025     04/24/2025    BUN 14 04/24/2025    CREATININE 0.68 04/24/2025    EGFR 114 04/24/2025     LFTs  Lab Results   Component Value Date    ALT 31 (H) 04/24/2025    AST 19 04/24/2025    ALKPHOS 55 04/24/2025    BILITOT 0.5 04/24/2025     FLP  Lab Results   Component Value Date    TRIG 100 04/24/2025    CHOL 218 (H) 04/24/2025    LDLF 116 (H) 12/07/2022    LDLCALC 139 (H) 04/24/2025    HDL 58 04/24/2025     Urine Microalbumin  No results found for: \"MICROALBCREA\"  Weight Management  Wt Readings from Last 3 Encounters:   05/05/25 85.7 kg (188 lb 14.4 oz)   03/20/25 85.3 kg (188 lb)   02/10/25 86.6 kg (191 lb)      There is no height or weight on file to calculate BMI.    Assessment/Plan   Problem List Items Addressed This Visit       Class 1 obesity due to excess calories without serious comorbidity with body mass index (BMI) " of 30.0 to 30.9 in adult - Primary    Patient's current weight reported as 187 lbs. Has lost 1 lbs. (0.5% of TBW) since starting therapy plan.  Current regimen includes: Zepbound 2.5mg vials weekly  Rationale for plan: Patient started Zepbound 2.5mg vials and experienced some nausea and cramping for 2 days after initial dose, now resolved. Continues to experience mild constipation and treating by increasing water intake and fiber and taking magnesium oxide nightly. Recommended continuing these methods and allowing time for bowel regulation. If constipation continues over next 2 weeks, may try adding daily Miralax. Due to overall tolerating, indication for further weight loss, and patient preference plan to continue titrating Zepbound as tolerating. Follow-up in 4 weeks.     Medication Changes:  INCREASE  Zepbound 5mg vials once weekly (after completing remaining 2.5mg doses)         Relevant Medications    tirzepatide, weight loss, 5 mg/0.5 mL solution    Other Relevant Orders    Referral to Clinical Pharmacy     Patient Education:  Counseled patient on relevant medication mechanisms of action, expectations, side effects, duration of therapy, contraindications, administration, and monitoring parameters.  All questions and concerns addressed. Contact pharmacist with any further questions or concerns prior to next appointment.    Clinical Pharmacist follow-up: 6/5/25 at 2:20pm, Telehealth visit  Patient is not followed in [facility].    Thank you,  Nyla Carpenter, [facility]  Clinical Pharmacy Specialist  [phone]    Continue all meds under the continuation of care with the referring provider and clinical pharmacy team.  Verbal consent to manage patient's drug therapy was obtained from the patient. They were informed they may decline to participate or withdraw from participation in pharmacy services at any time.         [1]   Allergies  Allergen Reactions    Aripiprazole Other     Reaction Date:akathesia

## 2025-05-08 NOTE — ASSESSMENT & PLAN NOTE
Patient's current weight reported as 187 lbs. Has lost 1 lbs. (0.5% of TBW) since starting therapy plan.  Current regimen includes: Zepbound 2.5mg vials weekly  Rationale for plan: Patient started Zepbound 2.5mg vials and experienced some nausea and cramping for 2 days after initial dose, now resolved. Continues to experience mild constipation and treating by increasing water intake and fiber and taking magnesium oxide nightly. Recommended continuing these methods and allowing time for bowel regulation. If constipation continues over next 2 weeks, may try adding daily Miralax. Due to overall tolerating, indication for further weight loss, and patient preference plan to continue titrating Zepbound as tolerating. Follow-up in 4 weeks.     Medication Changes:  INCREASE  Zepbound 5mg vials once weekly (after completing remaining 2.5mg doses)

## 2025-05-14 ENCOUNTER — OFFICE VISIT (OUTPATIENT)
Dept: OBSTETRICS AND GYNECOLOGY | Facility: CLINIC | Age: 40
End: 2025-05-14
Payer: COMMERCIAL

## 2025-05-14 VITALS
BODY MASS INDEX: 28.31 KG/M2 | DIASTOLIC BLOOD PRESSURE: 77 MMHG | SYSTOLIC BLOOD PRESSURE: 127 MMHG | HEART RATE: 76 BPM | HEIGHT: 68 IN | WEIGHT: 186.8 LBS

## 2025-05-14 DIAGNOSIS — Z01.419 WELL WOMAN EXAM: Primary | ICD-10-CM

## 2025-05-14 PROCEDURE — 99395 PREV VISIT EST AGE 18-39: CPT | Performed by: NURSE PRACTITIONER

## 2025-05-14 PROCEDURE — 3008F BODY MASS INDEX DOCD: CPT | Performed by: NURSE PRACTITIONER

## 2025-05-14 ASSESSMENT — ENCOUNTER SYMPTOMS
CARDIOVASCULAR NEGATIVE: 1
EYES NEGATIVE: 1
NEUROLOGICAL NEGATIVE: 1
RESPIRATORY NEGATIVE: 1
HEMATOLOGIC/LYMPHATIC NEGATIVE: 1
ENDOCRINE NEGATIVE: 1
CONSTITUTIONAL NEGATIVE: 1
PSYCHIATRIC NEGATIVE: 1
GASTROINTESTINAL NEGATIVE: 1
ALLERGIC/IMMUNOLOGIC NEGATIVE: 1
MUSCULOSKELETAL NEGATIVE: 1

## 2025-05-14 ASSESSMENT — PAIN SCALES - GENERAL: PAINLEVEL_OUTOF10: 0-NO PAIN

## 2025-05-14 NOTE — PROGRESS NOTES
"Subjective   Ninoska Crain is a 39 y.o. female who is here for a routine exam.     Current contraception: status post hysterectomy  History of abnormal Pap smear: no  Family history of uterine or ovarian cancer: no  History of abnormal mammogram: no  Family history of breast cancer: no  History of abnormal lipids: no  Menstrual History:  OB History    No obstetric history on file.        Patient's last menstrual period was 03/17/2023 (exact date).       HPI  Reports sx of OAB, she has not been performing pelvic floor exercises regularly. Urinary sx improve when she does the exercises. She had a normal pap smear last year. Hx of supracervical hysterectomy.    Review of Systems   Constitutional: Negative.    HENT: Negative.     Eyes: Negative.    Respiratory: Negative.     Cardiovascular: Negative.    Gastrointestinal: Negative.    Endocrine: Negative.    Genitourinary: Negative.    Musculoskeletal: Negative.    Skin: Negative.    Allergic/Immunologic: Negative.    Neurological: Negative.    Hematological: Negative.    Psychiatric/Behavioral: Negative.         Objective   /77 (BP Location: Left arm, Patient Position: Sitting)   Pulse 76   Ht 1.727 m (5' 8\")   Wt 84.7 kg (186 lb 12.8 oz)   LMP 03/17/2023 (Exact Date)   BMI 28.40 kg/m²     Physical Exam  Constitutional:       Appearance: Normal appearance.   Genitourinary:      Vulva, bladder and urethral meatus normal.        Right Adnexa: no mass present.     Left Adnexa: no mass present.     Cervix is not absent.      No cervical motion tenderness.      Uterus is absent.      Pelvic exam was performed with patient in the lithotomy position.   HENT:      Head: Normocephalic and atraumatic.   Neurological:      General: No focal deficit present.      Mental Status: She is alert and oriented to person, place, and time.   Psychiatric:         Mood and Affect: Mood normal.         Behavior: Behavior normal.         Thought Content: Thought content normal.        "  Judgment: Judgment normal.         Assessment/Plan   39 y.o. female presents for well woman exam. Comorbidities include: obesity.    Diagnosis List:  #1 Well woman exam    Plan:  1. Well woman exam  - Pelvic exam performed; unremarkable findings.   - Plan to start mammograms in 2026.  - Next pap smear due in 2027.  - Follow up in Urogynecology.     Follow up in 1 year with DELMER Puente.      Scribe Attestation  By signing my name below, IShelbie Scribe, attest that this documentation has been prepared under the direction and in the presence of DELMER Puente on 05/14/2025 at 6:24 PM.     SPEKE audio duration: 9 minutes    I spent a total of 14 minutes in face to face and non face to face time.     I, DELMER Puente, personally performed the services described in this documentation which was scribed virtually and I confirm that it is both accurate and complete.     DELMER Puente

## 2025-06-05 ENCOUNTER — APPOINTMENT (OUTPATIENT)
Dept: PHARMACY | Facility: HOSPITAL | Age: 40
End: 2025-06-05
Payer: COMMERCIAL

## 2025-06-05 DIAGNOSIS — E66.09 CLASS 1 OBESITY DUE TO EXCESS CALORIES WITHOUT SERIOUS COMORBIDITY WITH BODY MASS INDEX (BMI) OF 30.0 TO 30.9 IN ADULT: ICD-10-CM

## 2025-06-05 DIAGNOSIS — E66.811 CLASS 1 OBESITY DUE TO EXCESS CALORIES WITHOUT SERIOUS COMORBIDITY WITH BODY MASS INDEX (BMI) OF 30.0 TO 30.9 IN ADULT: ICD-10-CM

## 2025-06-09 NOTE — ASSESSMENT & PLAN NOTE
Patient's current weight reported as 177 lbs. Has lost 11 lbs. (6% of TBW) since starting therapy plan.  Current regimen includes: Zepbound 5mg vials weekly  Rationale for plan: Dose of Zepbound recently increased to 5mg and tolerated with some moderate adverse effects. Experiencing nausea for the few few days after each weekly dose, managed with ondansetron and gradually improving. Also experiencing diarrhea and cramping, managed with loperamide as needed. Patient followed BRAT diet for several days due to adverse effects, has now returned to typical diet. Noticing increased sensitivity to salads, coffee, and overeating. Due to ongoing adverse effects with gradual improvement and patient preference, plan to continue 5mg dose for additional month. Follow-up in 4 weeks.    Medication Changes:  CONTINUE  Zepbound 5mg vials once weekly

## 2025-06-09 NOTE — PROGRESS NOTES
Clinical Pharmacy Appointment    Patient ID: Ninoska Crain is a 39 y.o. female who presents for Obesity.    Pt is here for Follow Up appointment.  Referring Provider: Johnathon Cotter APRN-* - last visit: 5/5/25, next visit: 11/3/25   PCP: JOSIAH Abrams-CNP     Subjective   HPI  PMH significant for Obesity, Hx internal hernia and SBO, OAB and SARAH, migraine, depression/anxiety.  Special needs/barriers to therapy: None identified    Medication Reconciliation:  No changes    Drug Interactions  No relevant drug interactions were noted.    Medication System Management  Adherence/Organization: No current concerns  Affordability/Accessibility: No current concerns  Patient's preferred pharmacy:     [facility]  [address], [address]  [facility] [address]  Phone: [phone] Fax: [phone]    [facility] [address] [place] [facility] [place] - [address] [facility]  [address] [facility] [address]  Phone: [phone] Fax: [phone]    [facility] [employer] [facility] [address]   [address]  [facility] [address]  Phone: [phone] Fax: [phone]       WEIGHT MANAGEMENT  BMI Readings from Last 1 Encounters:   05/14/25 28.40 kg/m²   Starting weight: 188 lbs. (3/20/25)   Previous weight: 187 lbs. (5/5/25)  Current weight: 177 lbs.    Weight Goals  Next goal: Weight -10% (169 lbs.)  Maintenance BMI Goal: 18.5 to 24.9  Maintenance Weight Goal: 122 to 164 lbs.  Patient defined goal(s): 145 lbs.    Lifestyle  Diet: Appetite suppression and smaller meals. Has noticed stomach is more sensitive now, salad, coffee, or overeating sometimes trigger GI upset. Followed BRAT diet for several days due to nausea, now returning to typical foods.  Physical Activity: No recent changes    Pertinent PMH Review  Comorbidities: dyslipidemias and urinary stress incontinence  Hx or FH Hx of MTC/MEN2?: No  Pancreatitis?: No  Gastroparesis?: No  Cholecystitis?: No  Hx incarcerated  "hernia and small bowel obstruction 2023    Pharmacological Therapy  Current Medications:   Zepbound 5mg vials once weekly (Fridays)  Previous Medications: phentermine (short-term, helped her focus), Contrave (ineffective), Qsymia (ineffective)      Clarifications to above regimen: None  Adverse Effects: Nausea for a few days after each dose, treated with ondansetron and gradually improving. Diarrhea and cramping, discontinued magnesium and treated with immodium as needed    Insurance coverage of weight-loss medications? No, Plan Benefit Exclusion  Eligible for copay cards/programs? Yes  Eligible for [facility]? N/A      Preventative Care  Immunizations Needed: All up-to-date and documented  Tobacco Use: non-smoker      Objective   Allergies[1]  Social History     Social History Narrative    Not on file      Medication Review  Current Outpatient Medications   Medication Instructions    ascorbic acid (VITAMIN C) 1,000 mg, Daily    cholecalciferol (Vitamin D-3) 50 mcg (2,000 unit) capsule 1 capsule, Daily    fluticasone (Flonase) 50 mcg/actuation nasal spray 2 sprays, Daily    loratadine (CLARITIN) 10 mg, Daily    magnesium oxide (MAG-OX) 500 mg, Daily    multivitamin tablet 1 tablet, Daily    omega 3-dha-epa-fish oil (Fish OiL) 1,000 mg (120 mg-180 mg) capsule 1 capsule, 2 times daily    sertraline (ZOLOFT) 200 mg, oral, Daily    tirzepatide, weight loss, 5 mg/0.5 mL solution 1 vial, subcutaneous, Every 7 days      Vitals  BP Readings from Last 2 Encounters:   05/14/25 127/77   05/05/25 128/72     Wt Readings from Last 3 Encounters:   05/14/25 84.7 kg (186 lb 12.8 oz)   05/05/25 85.7 kg (188 lb 14.4 oz)   03/20/25 85.3 kg (188 lb)      There is no height or weight on file to calculate BMI.  Labs  A1C  No results found for: \"HGBA1C\"  Metabolic Panel  Lab Results   Component Value Date    EGFR 114 04/24/2025    CREATININE 0.68 04/24/2025     04/24/2025    K 4.8 04/24/2025    CALCIUM 9.4 04/24/2025     04/24/2025 " "   CO2 25 04/24/2025    BUN 14 04/24/2025     Liver function  Lab Results   Component Value Date    ALT 31 (H) 04/24/2025    AST 19 04/24/2025    ALKPHOS 55 04/24/2025    BILITOT 0.5 04/24/2025     Lipid Panel  Lab Results   Component Value Date    CHOL 218 (H) 04/24/2025    LDLCALC 139 (H) 04/24/2025    TRIG 100 04/24/2025    HDL 58 04/24/2025     Urine Microalbumin  No results found for: \"MICROALBCREA\"    Assessment/Plan   Problem List Items Addressed This Visit       Class 1 obesity due to excess calories without serious comorbidity with body mass index (BMI) of 30.0 to 30.9 in adult    Patient's current weight reported as 177 lbs. Has lost 11 lbs. (6% of TBW) since starting therapy plan.  Current regimen includes: Zepbound 5mg vials weekly  Rationale for plan: Dose of Zepbound recently increased to 5mg and tolerated with some moderate adverse effects. Experiencing nausea for the few few days after each weekly dose, managed with ondansetron and gradually improving. Also experiencing diarrhea and cramping, managed with loperamide as needed. Patient followed BRAT diet for several days due to adverse effects, has now returned to typical diet. Noticing increased sensitivity to salads, coffee, and overeating. Due to ongoing adverse effects with gradual improvement and patient preference, plan to continue 5mg dose for additional month. Follow-up in 4 weeks.    Medication Changes:  CONTINUE  Zepbound 5mg vials once weekly          Relevant Medications    tirzepatide, weight loss, 5 mg/0.5 mL solution    Other Relevant Orders    Referral to Clinical Pharmacy     Patient Education:  Counseled patient on relevant medication mechanisms of action, expectations, side effects, duration of therapy, contraindications, administration, and monitoring parameters.  All questions and concerns addressed. Contact pharmacist with any further questions or concerns prior to next appointment.    Clinical Pharmacist follow-up: 7/3/25 at " 2:20pm, Telehealth visit  Patient is not followed in [facility].    Thank you,  Nyla Carpenter, [facility]  Clinical Pharmacy Specialist  [phone]    Continue all meds under the continuation of care with the referring provider and clinical pharmacy team.  Verbal consent to manage patient's drug therapy was obtained from the patient. They were informed they may decline to participate or withdraw from participation in pharmacy services at any time.       [1]   Allergies  Allergen Reactions    Aripiprazole Other     Reaction Date:akathesia

## 2025-06-16 DIAGNOSIS — R10.84 GENERALIZED ABDOMINAL PAIN: ICD-10-CM

## 2025-06-16 DIAGNOSIS — R11.0 NAUSEA: ICD-10-CM

## 2025-06-16 PROCEDURE — RXMED WILLOW AMBULATORY MEDICATION CHARGE

## 2025-06-16 RX ORDER — ONDANSETRON 4 MG/1
4 TABLET, ORALLY DISINTEGRATING ORAL EVERY 8 HOURS PRN
Qty: 20 TABLET | Refills: 0 | Status: SHIPPED | OUTPATIENT
Start: 2025-06-16 | End: 2025-06-24

## 2025-06-17 ENCOUNTER — PHARMACY VISIT (OUTPATIENT)
Dept: PHARMACY | Facility: CLINIC | Age: 40
End: 2025-06-17
Payer: COMMERCIAL

## 2025-07-03 ENCOUNTER — PHARMACY VISIT (OUTPATIENT)
Dept: PHARMACY | Facility: CLINIC | Age: 40
End: 2025-07-03
Payer: COMMERCIAL

## 2025-07-03 ENCOUNTER — APPOINTMENT (OUTPATIENT)
Dept: PHARMACY | Facility: HOSPITAL | Age: 40
End: 2025-07-03
Payer: COMMERCIAL

## 2025-07-03 DIAGNOSIS — E66.811 CLASS 1 OBESITY DUE TO EXCESS CALORIES WITHOUT SERIOUS COMORBIDITY WITH BODY MASS INDEX (BMI) OF 30.0 TO 30.9 IN ADULT: ICD-10-CM

## 2025-07-03 DIAGNOSIS — E66.09 CLASS 1 OBESITY DUE TO EXCESS CALORIES WITHOUT SERIOUS COMORBIDITY WITH BODY MASS INDEX (BMI) OF 30.0 TO 30.9 IN ADULT: ICD-10-CM

## 2025-07-03 PROCEDURE — RXMED WILLOW AMBULATORY MEDICATION CHARGE

## 2025-07-03 NOTE — ASSESSMENT & PLAN NOTE
Patient's current weight reported as 172 lbs. Has lost 16 lbs. (8.5% of TBW) since starting therapy plan.  Current regimen includes: Zepbound 2.5mg vials weekly  Rationale for plan: Patient tolerating reduced dose of Zepbound well. Stomach upset is resolved and appetite has returned. She is eating meals and enjoying food again. Continuing to lose weight at appropriate rate. Due to 2.5 dose well-tolerated, plan to continue current regimen and follow-up in 1 month.     Medication Changes:  CONTINUE  Zepbound 2.5mg vials once weekly    Additional Instructions  If max-tolerated dose of Zepbound is not providing sufficient appetite control and weight loss, may consider switching to Wegovy

## 2025-07-03 NOTE — PROGRESS NOTES
Clinical Pharmacy Appointment    Patient ID: Ninoska Crain is a 39 y.o. female who presents for Obesity.    Pt is here for Follow Up appointment.  Referring Provider: Johnathon Cotter APRN-* - last visit: 5/5/25, next visit: 11/3/25   PCP: JOSIAH Abrams-CNP     Subjective   HPI  PMH significant for Obesity, Hx internal hernia and SBO, OAB and SARAH, migraine, depression/anxiety.  Special needs/barriers to therapy: None identified    Medication Reconciliation:  No changes    Drug Interactions  No relevant drug interactions were noted.    Medication System Management  Adherence/Organization: No current concerns  Affordability/Accessibility: No current concerns  Patient's preferred pharmacy:     [facility]  [address], [address]  [facility] [address]  Phone: [phone] Fax: [phone]    [facility] [address] [place] [facility] [place] - [address] [facility]  [address] [facility] [address]  Phone: [phone] Fax: [phone]    [facility] [employer] [facility] [address]   [address]  [facility] [address]  Phone: [phone] Fax: [phone]       WEIGHT MANAGEMENT  BMI Readings from Last 1 Encounters:   05/14/25 28.40 kg/m²   Starting weight: 188 lbs. (3/20/25)   Previous weight: 177 lbs. (6/5/25)  Current weight: 172 lbs.    Weight Goals  Next goal: Weight -10% (169 lbs.)  Maintenance BMI Goal: 18.5 to 24.9  Maintenance Weight Goal: 122 to 164 lbs.  Patient defined goal(s): 145 lbs.    Lifestyle  Diet: 3 meals per day. Appetite has returned and she is enjoying food again.   Physical Activity: No recent changes.    Pertinent PMH Review  Comorbidities: dyslipidemias and urinary stress incontinence  Hx or FH Hx of MTC/MEN2?: No  Pancreatitis?: No  Gastroparesis?: No  Cholecystitis?: No  Hx incarcerated hernia and small bowel obstruction 2023    Pharmacological Therapy  Current Medications:   Zepbound 2.5mg vials once weekly (Fridays)  Previous  "Medications: phentermine (short-term, helped her focus), Contrave (ineffective), Qsymia (ineffective)      Clarifications to above regimen: None  Adverse Effects: None, stomach upset and appetite suppression resolved    Insurance coverage of weight-loss medications? No, Plan Benefit Exclusion  Eligible for copay cards/programs? Yes  Eligible for  PAP? N/A      Preventative Care  Immunizations Needed: All up-to-date and documented  Tobacco Use: non-smoker      Objective   Allergies[1]  Social History     Social History Narrative    Not on file      Medication Review  Current Outpatient Medications   Medication Instructions    ascorbic acid (VITAMIN C) 1,000 mg, Daily    cholecalciferol (Vitamin D-3) 50 mcg (2,000 unit) capsule 1 capsule, Daily    fluticasone (Flonase) 50 mcg/actuation nasal spray 2 sprays, Daily    loratadine (CLARITIN) 10 mg, Daily    magnesium oxide (MAG-OX) 500 mg, Daily    multivitamin tablet 1 tablet, Daily    omega 3-dha-epa-fish oil (Fish OiL) 1,000 mg (120 mg-180 mg) capsule 1 capsule, 2 times daily    sertraline (ZOLOFT) 200 mg, oral, Daily    tirzepatide, weight loss, 2.5 mg/0.5 mL solution 1 vial, subcutaneous, Every 7 days      Vitals  BP Readings from Last 2 Encounters:   05/14/25 127/77   05/05/25 128/72     Wt Readings from Last 3 Encounters:   05/14/25 84.7 kg (186 lb 12.8 oz)   05/05/25 85.7 kg (188 lb 14.4 oz)   03/20/25 85.3 kg (188 lb)      There is no height or weight on file to calculate BMI.  Labs  A1C  No results found for: \"HGBA1C\"  Metabolic Panel  Lab Results   Component Value Date    EGFR 114 04/24/2025    CREATININE 0.68 04/24/2025     04/24/2025    K 4.8 04/24/2025    CALCIUM 9.4 04/24/2025     04/24/2025    CO2 25 04/24/2025    BUN 14 04/24/2025     Liver function  Lab Results   Component Value Date    ALT 31 (H) 04/24/2025    AST 19 04/24/2025    ALKPHOS 55 04/24/2025    BILITOT 0.5 04/24/2025     Lipid Panel  Lab Results   Component Value Date    CHOL " "218 (H) 04/24/2025    LDLCALC 139 (H) 04/24/2025    TRIG 100 04/24/2025    HDL 58 04/24/2025     Urine Microalbumin  No results found for: \"MICROALBCREA\"    Assessment/Plan   Problem List Items Addressed This Visit       Class 1 obesity due to excess calories without serious comorbidity with body mass index (BMI) of 30.0 to 30.9 in adult    Patient's current weight reported as 172 lbs. Has lost 16 lbs. (8.5% of TBW) since starting therapy plan.  Current regimen includes: Zepbound 2.5mg vials weekly  Rationale for plan: Patient tolerating reduced dose of Zepbound well. Stomach upset is resolved and appetite has returned. She is eating meals and enjoying food again. Continuing to lose weight at appropriate rate. Due to 2.5 dose well-tolerated, plan to continue current regimen and follow-up in 1 month.     Medication Changes:  CONTINUE  Zepbound 2.5mg vials once weekly    Additional Instructions  If max-tolerated dose of Zepbound is not providing sufficient appetite control and weight loss, may consider switching to Wegovy         Relevant Orders    Referral to Clinical Pharmacy     Patient Education:  Counseled patient on relevant medication mechanisms of action, expectations, side effects, duration of therapy, contraindications, administration, and monitoring parameters.  All questions and concerns addressed. Contact pharmacist with any further questions or concerns prior to next appointment.    Clinical Pharmacist follow-up: 8/7/25 at 2:20pm, Telehealth visit  Patient is not followed in [facility].    Thank you,  Nyla Carpenter, [facility]  Clinical Pharmacy Specialist  [phone]    Continue all meds under the continuation of care with the referring provider and clinical pharmacy team.  Verbal consent to manage patient's drug therapy was obtained from the patient. They were informed they may decline to participate or withdraw from participation in pharmacy services at any time.         [1]   Allergies  Allergen Reactions "    Aripiprazole Other     Reaction Date:akathesia

## 2025-07-27 DIAGNOSIS — E78.2 MIXED HYPERLIPIDEMIA: ICD-10-CM

## 2025-08-01 LAB
CHOLEST SERPL-MCNC: 185 MG/DL
CHOLEST/HDLC SERPL: 3.8 (CALC)
HDLC SERPL-MCNC: 49 MG/DL
LDLC SERPL CALC-MCNC: 119 MG/DL (CALC)
NONHDLC SERPL-MCNC: 136 MG/DL (CALC)
TRIGL SERPL-MCNC: 76 MG/DL

## 2025-08-07 ENCOUNTER — APPOINTMENT (OUTPATIENT)
Dept: PHARMACY | Facility: HOSPITAL | Age: 40
End: 2025-08-07
Payer: COMMERCIAL

## 2025-08-07 DIAGNOSIS — E66.09 CLASS 1 OBESITY DUE TO EXCESS CALORIES WITHOUT SERIOUS COMORBIDITY WITH BODY MASS INDEX (BMI) OF 30.0 TO 30.9 IN ADULT: ICD-10-CM

## 2025-08-07 DIAGNOSIS — E66.811 CLASS 1 OBESITY DUE TO EXCESS CALORIES WITHOUT SERIOUS COMORBIDITY WITH BODY MASS INDEX (BMI) OF 30.0 TO 30.9 IN ADULT: ICD-10-CM

## 2025-08-11 DIAGNOSIS — R11.2 NAUSEA AND VOMITING, UNSPECIFIED VOMITING TYPE: Primary | ICD-10-CM

## 2025-08-11 PROCEDURE — RXMED WILLOW AMBULATORY MEDICATION CHARGE

## 2025-08-11 RX ORDER — ONDANSETRON 4 MG/1
4 TABLET, ORALLY DISINTEGRATING ORAL EVERY 8 HOURS PRN
Qty: 30 TABLET | Refills: 0 | Status: SHIPPED | OUTPATIENT
Start: 2025-08-11 | End: 2025-08-23

## 2025-08-13 ENCOUNTER — PHARMACY VISIT (OUTPATIENT)
Dept: PHARMACY | Facility: CLINIC | Age: 40
End: 2025-08-13
Payer: COMMERCIAL

## 2025-08-25 DIAGNOSIS — J01.00 ACUTE NON-RECURRENT MAXILLARY SINUSITIS: Primary | ICD-10-CM

## 2025-08-25 RX ORDER — AZITHROMYCIN 250 MG/1
TABLET, FILM COATED ORAL
Qty: 6 TABLET | Refills: 0 | Status: SHIPPED | OUTPATIENT
Start: 2025-08-25 | End: 2025-08-30

## 2025-08-26 ENCOUNTER — PHARMACY VISIT (OUTPATIENT)
Dept: PHARMACY | Facility: CLINIC | Age: 40
End: 2025-08-26
Payer: COMMERCIAL

## 2025-08-26 DIAGNOSIS — H10.029 PINK EYE DISEASE, UNSPECIFIED LATERALITY: Primary | ICD-10-CM

## 2025-08-26 PROCEDURE — RXMED WILLOW AMBULATORY MEDICATION CHARGE

## 2025-08-26 RX ORDER — TOBRAMYCIN 3 MG/ML
1 SOLUTION/ DROPS OPHTHALMIC EVERY 4 HOURS
Qty: 5 ML | Refills: 0 | Status: SHIPPED | OUTPATIENT
Start: 2025-08-26 | End: 2025-09-02

## 2025-09-04 ENCOUNTER — APPOINTMENT (OUTPATIENT)
Dept: PHARMACY | Facility: HOSPITAL | Age: 40
End: 2025-09-04
Payer: COMMERCIAL

## 2025-10-02 ENCOUNTER — APPOINTMENT (OUTPATIENT)
Dept: PHARMACY | Facility: HOSPITAL | Age: 40
End: 2025-10-02
Payer: COMMERCIAL

## 2025-11-03 ENCOUNTER — APPOINTMENT (OUTPATIENT)
Dept: PRIMARY CARE | Facility: CLINIC | Age: 40
End: 2025-11-03
Payer: COMMERCIAL

## (undated) DEVICE — SCISSORS, METZ, CURVED, 3/4 BLADE

## (undated) DEVICE — SUTURE, CTD VICRYL, 2-0, VIL BR UR-5

## (undated) DEVICE — SUTURE,  V-LOC,  0 GS-21 6IN

## (undated) DEVICE — TROCAR, KII OPTICAL BLADELESS 5MM Z THREAD 100MM LNGTH

## (undated) DEVICE — SUTURE, MONOCRYL, 4-0, 18 IN, PS2, UNDYED

## (undated) DEVICE — TROCAR, OPTICAL, BLADELESS, 12MM, THREADED, 100MM LENGTH

## (undated) DEVICE — DEVICE, OMNICLOSE, 10MM

## (undated) DEVICE — Device